# Patient Record
Sex: MALE | Race: WHITE | NOT HISPANIC OR LATINO
[De-identification: names, ages, dates, MRNs, and addresses within clinical notes are randomized per-mention and may not be internally consistent; named-entity substitution may affect disease eponyms.]

---

## 2017-01-20 ENCOUNTER — APPOINTMENT (OUTPATIENT)
Dept: ORTHOPEDIC SURGERY | Facility: CLINIC | Age: 27
End: 2017-01-20

## 2017-01-20 VITALS
WEIGHT: 176 LBS | SYSTOLIC BLOOD PRESSURE: 120 MMHG | DIASTOLIC BLOOD PRESSURE: 76 MMHG | HEIGHT: 69 IN | BODY MASS INDEX: 26.07 KG/M2

## 2017-03-06 ENCOUNTER — RX RENEWAL (OUTPATIENT)
Age: 27
End: 2017-03-06

## 2017-03-10 ENCOUNTER — APPOINTMENT (OUTPATIENT)
Dept: ORTHOPEDIC SURGERY | Facility: CLINIC | Age: 27
End: 2017-03-10

## 2017-03-10 VITALS
WEIGHT: 167 LBS | SYSTOLIC BLOOD PRESSURE: 120 MMHG | DIASTOLIC BLOOD PRESSURE: 80 MMHG | HEIGHT: 69 IN | BODY MASS INDEX: 24.73 KG/M2

## 2017-04-17 ENCOUNTER — RX RENEWAL (OUTPATIENT)
Age: 27
End: 2017-04-17

## 2017-06-10 ENCOUNTER — TRANSCRIPTION ENCOUNTER (OUTPATIENT)
Age: 27
End: 2017-06-10

## 2017-06-22 ENCOUNTER — APPOINTMENT (OUTPATIENT)
Dept: ORTHOPEDIC SURGERY | Facility: CLINIC | Age: 27
End: 2017-06-22

## 2017-06-22 VITALS — BODY MASS INDEX: 24.44 KG/M2 | WEIGHT: 165 LBS | HEIGHT: 69 IN

## 2017-10-24 ENCOUNTER — APPOINTMENT (OUTPATIENT)
Dept: ORTHOPEDIC SURGERY | Facility: CLINIC | Age: 27
End: 2017-10-24
Payer: COMMERCIAL

## 2017-10-24 VITALS — BODY MASS INDEX: 24.44 KG/M2 | HEIGHT: 69 IN | WEIGHT: 165 LBS

## 2017-10-24 PROCEDURE — 99213 OFFICE O/P EST LOW 20 MIN: CPT

## 2018-04-27 ENCOUNTER — INPATIENT (INPATIENT)
Facility: HOSPITAL | Age: 28
LOS: 3 days | Discharge: HOME | End: 2018-05-01
Attending: HOSPITALIST | Admitting: HOSPITALIST
Payer: COMMERCIAL

## 2018-04-27 VITALS
OXYGEN SATURATION: 100 % | TEMPERATURE: 99 F | RESPIRATION RATE: 18 BRPM | SYSTOLIC BLOOD PRESSURE: 137 MMHG | HEART RATE: 88 BPM | DIASTOLIC BLOOD PRESSURE: 72 MMHG

## 2018-04-27 DIAGNOSIS — B00.3 HERPESVIRAL MENINGITIS: ICD-10-CM

## 2018-04-27 DIAGNOSIS — Z29.9 ENCOUNTER FOR PROPHYLACTIC MEASURES, UNSPECIFIED: ICD-10-CM

## 2018-04-27 DIAGNOSIS — A41.9 SEPSIS, UNSPECIFIED ORGANISM: ICD-10-CM

## 2018-04-27 DIAGNOSIS — R51 HEADACHE: ICD-10-CM

## 2018-04-27 LAB
ALBUMIN SERPL ELPH-MCNC: 4.6 G/DL — SIGNIFICANT CHANGE UP (ref 3.3–5)
ALP SERPL-CCNC: 56 U/L — SIGNIFICANT CHANGE UP (ref 40–120)
ALT FLD-CCNC: 25 U/L — SIGNIFICANT CHANGE UP (ref 4–41)
APPEARANCE UR: CLEAR — SIGNIFICANT CHANGE UP
AST SERPL-CCNC: 24 U/L — SIGNIFICANT CHANGE UP (ref 4–40)
BASE EXCESS BLDV CALC-SCNC: 1.9 MMOL/L — SIGNIFICANT CHANGE UP
BASOPHILS # BLD AUTO: 0.05 K/UL — SIGNIFICANT CHANGE UP (ref 0–0.2)
BASOPHILS NFR BLD AUTO: 0.5 % — SIGNIFICANT CHANGE UP (ref 0–2)
BILIRUB SERPL-MCNC: 0.6 MG/DL — SIGNIFICANT CHANGE UP (ref 0.2–1.2)
BILIRUB UR-MCNC: NEGATIVE — SIGNIFICANT CHANGE UP
BLOOD UR QL VISUAL: NEGATIVE — SIGNIFICANT CHANGE UP
BUN SERPL-MCNC: 13 MG/DL — SIGNIFICANT CHANGE UP (ref 7–23)
CALCIUM SERPL-MCNC: 9.5 MG/DL — SIGNIFICANT CHANGE UP (ref 8.4–10.5)
CHLORIDE SERPL-SCNC: 100 MMOL/L — SIGNIFICANT CHANGE UP (ref 98–107)
CLARITY CSF: SIGNIFICANT CHANGE UP
CO2 SERPL-SCNC: 26 MMOL/L — SIGNIFICANT CHANGE UP (ref 22–31)
COLOR CSF: SIGNIFICANT CHANGE UP
COLOR SPEC: SIGNIFICANT CHANGE UP
CREAT SERPL-MCNC: 1.3 MG/DL — SIGNIFICANT CHANGE UP (ref 0.5–1.3)
EOSINOPHIL # BLD AUTO: 0.05 K/UL — SIGNIFICANT CHANGE UP (ref 0–0.5)
EOSINOPHIL NFR BLD AUTO: 0.5 % — SIGNIFICANT CHANGE UP (ref 0–6)
GAS PNL BLDV: 137 MMOL/L — SIGNIFICANT CHANGE UP (ref 136–146)
GLUCOSE BLDV-MCNC: 101 — HIGH (ref 70–99)
GLUCOSE CSF-MCNC: 61 MG/DL — SIGNIFICANT CHANGE UP (ref 40–70)
GLUCOSE SERPL-MCNC: 104 MG/DL — HIGH (ref 70–99)
GLUCOSE UR-MCNC: NEGATIVE — SIGNIFICANT CHANGE UP
GRAM STN CSF: SIGNIFICANT CHANGE UP
HCO3 BLDV-SCNC: 25 MMOL/L — SIGNIFICANT CHANGE UP (ref 20–27)
HCT VFR BLD CALC: 46.5 % — SIGNIFICANT CHANGE UP (ref 39–50)
HCT VFR BLDV CALC: 48.5 % — SIGNIFICANT CHANGE UP (ref 39–51)
HGB BLD-MCNC: 15.6 G/DL — SIGNIFICANT CHANGE UP (ref 13–17)
HGB BLDV-MCNC: 15.9 G/DL — SIGNIFICANT CHANGE UP (ref 13–17)
HSV2 DNA CSF QL NAA+PROBE: DETECTED — SIGNIFICANT CHANGE UP
IMM GRANULOCYTES # BLD AUTO: 0.04 # — SIGNIFICANT CHANGE UP
IMM GRANULOCYTES NFR BLD AUTO: 0.4 % — SIGNIFICANT CHANGE UP (ref 0–1.5)
KETONES UR-MCNC: NEGATIVE — SIGNIFICANT CHANGE UP
LEUKOCYTE ESTERASE UR-ACNC: NEGATIVE — SIGNIFICANT CHANGE UP
LYMPHOCYTES # BLD AUTO: 1.48 K/UL — SIGNIFICANT CHANGE UP (ref 1–3.3)
LYMPHOCYTES # BLD AUTO: 14.8 % — SIGNIFICANT CHANGE UP (ref 13–44)
LYMPHOCYTES # CSF: 83 % — SIGNIFICANT CHANGE UP
MCHC RBC-ENTMCNC: 29.9 PG — SIGNIFICANT CHANGE UP (ref 27–34)
MCHC RBC-ENTMCNC: 33.5 % — SIGNIFICANT CHANGE UP (ref 32–36)
MCV RBC AUTO: 89.1 FL — SIGNIFICANT CHANGE UP (ref 80–100)
MONOCYTES # BLD AUTO: 0.75 K/UL — SIGNIFICANT CHANGE UP (ref 0–0.9)
MONOCYTES # CSF: 12 % — SIGNIFICANT CHANGE UP
MONOCYTES NFR BLD AUTO: 7.5 % — SIGNIFICANT CHANGE UP (ref 2–14)
NEUTROPHILS # BLD AUTO: 7.66 K/UL — HIGH (ref 1.8–7.4)
NEUTROPHILS NFR BLD AUTO: 76.3 % — SIGNIFICANT CHANGE UP (ref 43–77)
NEUTS SEG NFR CSF MANUAL: 5 % — SIGNIFICANT CHANGE UP
NITRITE UR-MCNC: NEGATIVE — SIGNIFICANT CHANGE UP
NRBC # FLD: 0 — SIGNIFICANT CHANGE UP
NRBC NFR CSF: 319 CELL/UL — CRITICAL HIGH (ref 0–5)
PCO2 BLDV: 45 MMHG — SIGNIFICANT CHANGE UP (ref 41–51)
PH BLDV: 7.38 PH — SIGNIFICANT CHANGE UP (ref 7.32–7.43)
PH UR: 6.5 — SIGNIFICANT CHANGE UP (ref 4.6–8)
PLATELET # BLD AUTO: 237 K/UL — SIGNIFICANT CHANGE UP (ref 150–400)
PMV BLD: 9.1 FL — SIGNIFICANT CHANGE UP (ref 7–13)
PO2 BLDV: 33 MMHG — LOW (ref 35–40)
POTASSIUM BLDV-SCNC: 4.1 MMOL/L — SIGNIFICANT CHANGE UP (ref 3.4–4.5)
POTASSIUM SERPL-MCNC: 4 MMOL/L — SIGNIFICANT CHANGE UP (ref 3.5–5.3)
POTASSIUM SERPL-SCNC: 4 MMOL/L — SIGNIFICANT CHANGE UP (ref 3.5–5.3)
PROT CSF-MCNC: 132 MG/DL — HIGH (ref 15–40)
PROT SERPL-MCNC: 7.4 G/DL — SIGNIFICANT CHANGE UP (ref 6–8.3)
PROT UR-MCNC: 20 MG/DL — SIGNIFICANT CHANGE UP
RBC # BLD: 5.22 M/UL — SIGNIFICANT CHANGE UP (ref 4.2–5.8)
RBC # CSF: 3500 CELL/UL — HIGH (ref 0–0)
RBC # FLD: 12.4 % — SIGNIFICANT CHANGE UP (ref 10.3–14.5)
RBC CASTS # UR COMP ASSIST: SIGNIFICANT CHANGE UP (ref 0–?)
SAO2 % BLDV: 60.7 % — SIGNIFICANT CHANGE UP (ref 60–85)
SODIUM SERPL-SCNC: 139 MMOL/L — SIGNIFICANT CHANGE UP (ref 135–145)
SP GR SPEC: 1.02 — SIGNIFICANT CHANGE UP (ref 1–1.04)
SPECIMEN SOURCE: SIGNIFICANT CHANGE UP
TOTAL CELLS COUNTED, SPINAL FLUID: 100 CELLS — SIGNIFICANT CHANGE UP
UROBILINOGEN FLD QL: NORMAL MG/DL — SIGNIFICANT CHANGE UP
WBC # BLD: 10.03 K/UL — SIGNIFICANT CHANGE UP (ref 3.8–10.5)
WBC # FLD AUTO: 10.03 K/UL — SIGNIFICANT CHANGE UP (ref 3.8–10.5)
WBC UR QL: SIGNIFICANT CHANGE UP (ref 0–?)
XANTHOCHROMIA: SIGNIFICANT CHANGE UP

## 2018-04-27 PROCEDURE — 71046 X-RAY EXAM CHEST 2 VIEWS: CPT | Mod: 26

## 2018-04-27 PROCEDURE — 93010 ELECTROCARDIOGRAM REPORT: CPT

## 2018-04-27 PROCEDURE — 70496 CT ANGIOGRAPHY HEAD: CPT | Mod: 26

## 2018-04-27 PROCEDURE — 99223 1ST HOSP IP/OBS HIGH 75: CPT | Mod: GC

## 2018-04-27 PROCEDURE — 70450 CT HEAD/BRAIN W/O DYE: CPT | Mod: 26,59

## 2018-04-27 RX ORDER — SODIUM CHLORIDE 9 MG/ML
1000 INJECTION INTRAMUSCULAR; INTRAVENOUS; SUBCUTANEOUS
Qty: 0 | Refills: 0 | Status: DISCONTINUED | OUTPATIENT
Start: 2018-04-27 | End: 2018-04-28

## 2018-04-27 RX ORDER — ONDANSETRON 8 MG/1
4 TABLET, FILM COATED ORAL ONCE
Qty: 0 | Refills: 0 | Status: COMPLETED | OUTPATIENT
Start: 2018-04-27 | End: 2018-04-27

## 2018-04-27 RX ORDER — ACETAMINOPHEN 500 MG
650 TABLET ORAL EVERY 6 HOURS
Qty: 0 | Refills: 0 | Status: DISCONTINUED | OUTPATIENT
Start: 2018-04-27 | End: 2018-05-01

## 2018-04-27 RX ORDER — ACYCLOVIR SODIUM 500 MG
750 VIAL (EA) INTRAVENOUS ONCE
Qty: 0 | Refills: 0 | Status: COMPLETED | OUTPATIENT
Start: 2018-04-27 | End: 2018-04-27

## 2018-04-27 RX ORDER — SODIUM CHLORIDE 9 MG/ML
1000 INJECTION INTRAMUSCULAR; INTRAVENOUS; SUBCUTANEOUS ONCE
Qty: 0 | Refills: 0 | Status: COMPLETED | OUTPATIENT
Start: 2018-04-27 | End: 2018-04-27

## 2018-04-27 RX ORDER — KETOROLAC TROMETHAMINE 30 MG/ML
15 SYRINGE (ML) INJECTION ONCE
Qty: 0 | Refills: 0 | Status: DISCONTINUED | OUTPATIENT
Start: 2018-04-27 | End: 2018-04-27

## 2018-04-27 RX ORDER — METOCLOPRAMIDE HCL 10 MG
10 TABLET ORAL ONCE
Qty: 0 | Refills: 0 | Status: COMPLETED | OUTPATIENT
Start: 2018-04-27 | End: 2018-04-27

## 2018-04-27 RX ORDER — ACETAMINOPHEN 500 MG
650 TABLET ORAL ONCE
Qty: 0 | Refills: 0 | Status: COMPLETED | OUTPATIENT
Start: 2018-04-27 | End: 2018-04-27

## 2018-04-27 RX ORDER — MORPHINE SULFATE 50 MG/1
2 CAPSULE, EXTENDED RELEASE ORAL EVERY 4 HOURS
Qty: 0 | Refills: 0 | Status: DISCONTINUED | OUTPATIENT
Start: 2018-04-27 | End: 2018-04-30

## 2018-04-27 RX ORDER — MORPHINE SULFATE 50 MG/1
4 CAPSULE, EXTENDED RELEASE ORAL ONCE
Qty: 0 | Refills: 0 | Status: DISCONTINUED | OUTPATIENT
Start: 2018-04-27 | End: 2018-04-27

## 2018-04-27 RX ORDER — METOCLOPRAMIDE HCL 10 MG
10 TABLET ORAL EVERY 8 HOURS
Qty: 0 | Refills: 0 | Status: DISCONTINUED | OUTPATIENT
Start: 2018-04-27 | End: 2018-05-01

## 2018-04-27 RX ORDER — CEFTRIAXONE 500 MG/1
2 INJECTION, POWDER, FOR SOLUTION INTRAMUSCULAR; INTRAVENOUS ONCE
Qty: 0 | Refills: 0 | Status: COMPLETED | OUTPATIENT
Start: 2018-04-27 | End: 2018-04-27

## 2018-04-27 RX ORDER — KETOROLAC TROMETHAMINE 30 MG/ML
30 SYRINGE (ML) INJECTION ONCE
Qty: 0 | Refills: 0 | Status: DISCONTINUED | OUTPATIENT
Start: 2018-04-27 | End: 2018-04-27

## 2018-04-27 RX ORDER — ONDANSETRON 8 MG/1
4 TABLET, FILM COATED ORAL ONCE
Qty: 0 | Refills: 0 | Status: DISCONTINUED | OUTPATIENT
Start: 2018-04-27 | End: 2018-04-27

## 2018-04-27 RX ORDER — ACYCLOVIR SODIUM 500 MG
800 VIAL (EA) INTRAVENOUS EVERY 8 HOURS
Qty: 0 | Refills: 0 | Status: DISCONTINUED | OUTPATIENT
Start: 2018-04-28 | End: 2018-05-01

## 2018-04-27 RX ADMIN — MORPHINE SULFATE 4 MILLIGRAM(S): 50 CAPSULE, EXTENDED RELEASE ORAL at 17:24

## 2018-04-27 RX ADMIN — Medication 30 MILLIGRAM(S): at 17:43

## 2018-04-27 RX ADMIN — Medication 30 MILLIGRAM(S): at 10:58

## 2018-04-27 RX ADMIN — Medication 650 MILLIGRAM(S): at 17:24

## 2018-04-27 RX ADMIN — SODIUM CHLORIDE 1000 MILLILITER(S): 9 INJECTION INTRAMUSCULAR; INTRAVENOUS; SUBCUTANEOUS at 15:15

## 2018-04-27 RX ADMIN — SODIUM CHLORIDE 1000 MILLILITER(S): 9 INJECTION INTRAMUSCULAR; INTRAVENOUS; SUBCUTANEOUS at 17:43

## 2018-04-27 RX ADMIN — Medication 15 MILLIGRAM(S): at 19:44

## 2018-04-27 RX ADMIN — MORPHINE SULFATE 4 MILLIGRAM(S): 50 CAPSULE, EXTENDED RELEASE ORAL at 21:35

## 2018-04-27 RX ADMIN — Medication 650 MILLIGRAM(S): at 16:37

## 2018-04-27 RX ADMIN — Medication 40 MILLIGRAM(S): at 15:15

## 2018-04-27 RX ADMIN — MORPHINE SULFATE 4 MILLIGRAM(S): 50 CAPSULE, EXTENDED RELEASE ORAL at 21:19

## 2018-04-27 RX ADMIN — MORPHINE SULFATE 4 MILLIGRAM(S): 50 CAPSULE, EXTENDED RELEASE ORAL at 19:44

## 2018-04-27 RX ADMIN — SODIUM CHLORIDE 150 MILLILITER(S): 9 INJECTION INTRAMUSCULAR; INTRAVENOUS; SUBCUTANEOUS at 23:35

## 2018-04-27 RX ADMIN — Medication 265 MILLIGRAM(S): at 21:10

## 2018-04-27 RX ADMIN — Medication 10 MILLIGRAM(S): at 08:29

## 2018-04-27 RX ADMIN — CEFTRIAXONE 100 GRAM(S): 500 INJECTION, POWDER, FOR SOLUTION INTRAMUSCULAR; INTRAVENOUS at 17:43

## 2018-04-27 RX ADMIN — Medication 10 MILLIGRAM(S): at 13:54

## 2018-04-27 RX ADMIN — ONDANSETRON 4 MILLIGRAM(S): 8 TABLET, FILM COATED ORAL at 16:37

## 2018-04-27 RX ADMIN — Medication 15 MILLIGRAM(S): at 17:43

## 2018-04-27 RX ADMIN — SODIUM CHLORIDE 1000 MILLILITER(S): 9 INJECTION INTRAMUSCULAR; INTRAVENOUS; SUBCUTANEOUS at 08:29

## 2018-04-27 NOTE — H&P ADULT - HISTORY OF PRESENT ILLNESS
28yM with pmhx of wrestlers herpes? p/w b/l frontal pulsating HA since 4/26. Patient states that the HA woke him from sleep on Thursday morning at 3AM.  Associated with  nausea, fevers to 100.5 at home, lower back pain, photophobia, phonophobia. He assumed it was a migraine and took advil with no relief of his sx's.  Patient denies any rash, sick contacts, trauma, CP, SOB, chest palpitations, abdominal pain, dysuria, hematuria, joint pain, diarrhea, vomiting. Denies any prior hx of HA's or migraines. 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 States he hasn't had an outbreak in over a year. Denies any genital lesions, mouth lesions.  VS in the ED:  Febrile to 102.6, HR: , BP: 126//80, RR: 18 100% on RA. In the ED he received valium 2mg , ketorolac 45mg, solumedrol 40, reglan 10mg x 2, morphine 8mg, zofran, 3L NS bolus. Ceftriaxone 2g and Acyclovir 750mg

## 2018-04-27 NOTE — H&P ADULT - ASSESSMENT
28yM with pmhx of wrestlers herpes? 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

## 2018-04-27 NOTE — ED PROVIDER NOTE - CARE PLAN
Principal Discharge DX:	Headache  Assessment and plan of treatment:	Thank you for visiting our Emergency Department, it has been a pleasure taking part in your healthcare.    Your discharge diagnosis is: headache   Please take all discharge medications as indicated below:  Take Motrin/Tylenol for pain as needed, please follow instructions on manufacturers label. If you have any questions please consult a pharmacist or your PMD.  Please follow up with your PMD within x48 hours.  Please follow up with your neurologist within x48 hours.  A copy of resulted labs, imaging, and findings have been provided to you.   You have had a detailed discussion with your provider regarding your diagnosis, care management and discharge planning including, but not limited to: return precautions, follow up visits with existing or new providers, new prescriptions and/or medication changes, wound and/or spint/cast care or other care   aspects specific to your diagnosis and treatment. You have been given the opportunity to have your questions answered. At this time you have been deemed stable and fit for discharge.  Return precautions to the Emergency Department include but are not limited to: unrelenting nausea, vomiting, fever, chills, chest pain, shortness of breath, dizziness, chest or abdominal pain, worsening back pain, syncope, blood in urine or stool, headache that doesn't resolve, numbness or tingling, loss of sensation, loss of motor function, or any other concerning symptoms.

## 2018-04-27 NOTE — H&P ADULT - NSHPLABSRESULTS_GEN_ALL_CORE
LABS: Personally Read and Interpreted: CSF with elevated protein to 132, 300+ nucleated cells with 83-90% lymphocyte                        15.6   10.03 )-----------( 237      ( 2018 08:32 )             46.5     Hgb Trend: 15.6<--          139  |  100  |  13  ----------------------------<  104<H>  4.0   |  26  |  1.30    Ca    9.5      2018 08:32    TPro  7.4  /  Alb  4.6  /  TBili  0.6  /  DBili  x   /  AST  24  /  ALT  25  /  AlkPhos  56      Creatinine Trend: 1.30<--    Urinalysis Basic - ( 2018 09:49 )    Color: PLYEL / Appearance: CLEAR / S.016 / pH: 6.5  Gluc: NEGATIVE / Ketone: NEGATIVE  / Bili: NEGATIVE / Urobili: NORMAL mg/dL   Blood: NEGATIVE / Protein: 20 mg/dL / Nitrite: NEGATIVE   Leuk Esterase: NEGATIVE / RBC: 0-2 / WBC 0-2   Sq Epi: x / Non Sq Epi: x / Bacteria: x      EKG: none available     IMAGING:    < from: CT Angio Head w/ IV Cont (18 @ 13:48) >    HEAD CTA:  No evidence for occlusion or aneurysm.    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    POSTCONTRAST HEAD CT:  No acute intracranial pathology or abnormal enhancement.    < end of copied text > LABS: Personally Read and Interpreted: CSF with elevated protein to 132, 300+ nucleated cells with 83-90% lymphocyte                        15.6   10.03 )-----------( 237      ( 2018 08:32 )             46.5     Hgb Trend: 15.6<--          139  |  100  |  13  ----------------------------<  104<H>  4.0   |  26  |  1.30    Ca    9.5      2018 08:32    TPro  7.4  /  Alb  4.6  /  TBili  0.6  /  DBili  x   /  AST  24  /  ALT  25  /  AlkPhos  56      Creatinine Trend: 1.30<--    Urinalysis Basic - ( 2018 09:49 )    Color: PLYEL / Appearance: CLEAR / S.016 / pH: 6.5  Gluc: NEGATIVE / Ketone: NEGATIVE  / Bili: NEGATIVE / Urobili: NORMAL mg/dL   Blood: NEGATIVE / Protein: 20 mg/dL / Nitrite: NEGATIVE   Leuk Esterase: NEGATIVE / RBC: 0-2 / WBC 0-2   Sq Epi: x / Non Sq Epi: x / Bacteria: x      EKG: none available     IMAGING:    CXR- personally reviewed: no consolidations or effusions noted.    < from: CT Angio Head w/ IV Cont (18 @ 13:48) >    HEAD CTA:  No evidence for occlusion or aneurysm.    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    POSTCONTRAST HEAD CT:  No acute intracranial pathology or abnormal enhancement.    < end of copied text >

## 2018-04-27 NOTE — ED PROVIDER NOTE - OBJECTIVE STATEMENT
28M no pmh presents with a cc of b/l frontal pulsating HA awaking pt from sleep x2 days ago, a/w nausea, fevers to 100.5 at home, lower back pain, photophobia, phonophobia. No rash, no sick contacts. Vaccinations UTD. Never had headache like this before, no prior hx of migraines. No head trauma, no falls, no AC. Denies v/cp/sob. Denies syncope. Denies chest palpitations, abdominal pain. Denies dysuria, hematuria, hematochezia, BRBPR, tarry stools, diarrhea, constipation. 28M hx of wrestlers herpes presents with a cc of b/l frontal pulsating HA awaking pt from sleep x2 days ago, a/w nausea, fevers to 100.5 at home, lower back pain, photophobia, phonophobia. No rash, no sick contacts. Vaccinations UTD. Never had headache like this before, no prior hx of migraines. No head trauma, no falls, no AC. Denies v/cp/sob. Denies syncope. Denies chest palpitations, abdominal pain. Denies dysuria, hematuria, hematochezia, BRBPR, tarry stools, diarrhea, constipation.

## 2018-04-27 NOTE — H&P ADULT - NSHPREVIEWOFSYSTEMS_GEN_ALL_CORE
Constitutional: +fevers, chills, Denies night sweats, weight loss  HEENT: + visual changes. Denies hearing changes, rhinitis, odynophagia, or dysphagia  Cardiovascular: denies palpitations, chest pain, edema  Respiratory: denies SOB, wheezing  Gastrointestinal: + Nausea, Denies Diarrhea/ constipation, abdominal pain, hematochezia, melena  : denies dysuria, hematuria  MSK: denies weakness, joint pain. + back pain  Neuro: Denies Paresthesia/ weakness  Heme: Denies bleeding or hemoptysis   Skin: denies new rashes or masses

## 2018-04-27 NOTE — ED PROVIDER NOTE - PLAN OF CARE
Thank you for visiting our Emergency Department, it has been a pleasure taking part in your healthcare.    Your discharge diagnosis is: headache   Please take all discharge medications as indicated below:  Take Motrin/Tylenol for pain as needed, please follow instructions on manufacturers label. If you have any questions please consult a pharmacist or your PMD.  Please follow up with your PMD within x48 hours.  Please follow up with your neurologist within x48 hours.  A copy of resulted labs, imaging, and findings have been provided to you.   You have had a detailed discussion with your provider regarding your diagnosis, care management and discharge planning including, but not limited to: return precautions, follow up visits with existing or new providers, new prescriptions and/or medication changes, wound and/or spint/cast care or other care   aspects specific to your diagnosis and treatment. You have been given the opportunity to have your questions answered. At this time you have been deemed stable and fit for discharge.  Return precautions to the Emergency Department include but are not limited to: unrelenting nausea, vomiting, fever, chills, chest pain, shortness of breath, dizziness, chest or abdominal pain, worsening back pain, syncope, blood in urine or stool, headache that doesn't resolve, numbness or tingling, loss of sensation, loss of motor function, or any other concerning symptoms.

## 2018-04-27 NOTE — ED ADULT NURSE REASSESSMENT NOTE - NS ED NURSE REASSESS COMMENT FT1
Pt. alert, awake, c/o persistent headache and mid-back pain s/p prior LP earlier. Admits to chills, and body aches. Denies dizziness, LOC, N/V, SOB, cough, chest pain. Respirations even, unlabored. Appears uncomfortable and restless, skin warm to touch, VS as noted. MD notified and aware. Pt. medicated as ordered. Remains on droplet precautions for r/o meningitis.

## 2018-04-27 NOTE — ED PROVIDER NOTE - HEAD, MLM
Is This A New Presentation, Or A Follow-Up?: Skin Lesions How Severe Is Your Skin Lesion?: mild Have Your Skin Lesions Been Treated?: not been treated Additional History: Has few lesions on scalp and left preauricular area reported.  No hx of skin cancer. Which Family Member (Optional)?: Brother Head is atraumatic. Head shape is symmetrical.

## 2018-04-27 NOTE — H&P ADULT - NSHPPHYSICALEXAM_GEN_ALL_CORE
PHYSICAL EXAM:  Vital Signs Last 24 Hrs  T(C): 37.3 (04-27-18 @ 22:27)  T(F): 99.1 (04-27-18 @ 22:27), Max: 102.6 (04-27-18 @ 16:41)  HR: 89 (04-27-18 @ 22:27) (73 - 107)  BP: 140/80 (04-27-18 @ 22:27)  BP(mean): --  RR: 19 (04-27-18 @ 22:27) (16 - 19)  SpO2: 100% (04-27-18 @ 22:27) (98% - 100%)  Wt(kg): --    Constitutional: NAD, awake and alert, diaphoretic   EYES: EOMI, PERRL, Photophobia  ENT:  Normal Hearing, no tonsillar exudates   Neck: Soft and supple, No JVD, nuchal rigidity noted, + Brudzinski sign   Respiratory: Breath sounds are clear bilaterally, No wheezing, rales or rhonchi  Cardiovascular: S1 and S2, regular rate and rhythm, no Murmurs, gallops or rubs  Gastrointestinal: Bowel Sounds present, soft, nontender, nondistended, no guarding, no rebound  Extremities: No cyanosis or clubbing; warm to touch  Vascular: 2+ peripheral pulses lower ex  Neurological: A/O x 3, no focal deficits, + Brudzinski sign, - Kernig sign  Musculoskeletal: 5/5 strength b/l upper and lower extremities  Skin: Erythematous maculopapular rash noted on trunk, warm & dry  Psych: no depression or anhedonia  Heme: no bruises, no nose bleeds

## 2018-04-27 NOTE — H&P ADULT - PROBLEM SELECTOR PLAN 4
IMPROVE VTE Individual Risk Assessment    RISK                                                          Points  [] Previous VTE                                           3  [] Thrombophilia                                        2  [] Lower limb paralysis                              2   [] Current Cancer                                       2   [] Immobilization > 24 hrs                        1  [] ICU/CCU stay > 24 hours                       1  [] Age > 60                                                   1    IMPROVE VTE Score: 0    No chemical DVT PPX indicated

## 2018-04-27 NOTE — ED ADULT NURSE REASSESSMENT NOTE - GENERAL PATIENT STATE
comfortable appearance
comfortable appearance
comfortable appearance/smiling/interactive/improvement verbalized/family/SO at bedside

## 2018-04-27 NOTE — ED ADULT TRIAGE NOTE - CHIEF COMPLAINT QUOTE
pt. c/o frontal "pulsating headache" x 2 days, reports nausea w/out vomiting and dizziness when moving his head.  Took Excedrin migraine and Tylenol w/ little relief.  Denies neck stiffness.  No PMHx.

## 2018-04-27 NOTE — ED PROVIDER NOTE - PROGRESS NOTE DETAILS
Dr. Ortega: spoke at length with pt PMD; pt sibling had severe course in past from sepsis after an ER visit and family rightly so very concerned; pt symptoms are primarily lower back pain and headache.  Had multiple conversations with mom and dad with patient about course of bacterial meningitis and this being 2 days out with normal labs and being afebrile with no neck pain likelihood very very low.  Also informed of symptoms to look out for.  Spoke with radiologist no obvious signs of meningial disease but was informed CT poor test.  At this point family would prefer not to get LP and I am in agreement Dr. Ortega: after speaking again to family; pt lifted the most weight he ever has the night of headache; more concern for SAH; spoke with family; will proceed with LP Dr. Ortega: LP unsuccessful; as discussed below testing threshold for meningitis but some concern for SAH; will get CTA to r/o aneurysm Dr. Ortega: CTA negative for SAH/aneurysm; pt having worsening headache; will treat with IVF and steroids; will sign out to incoming attending if headache continues neurology consult and likely admission; if improves given CTA negative and afebrile and well appearing throughout course here can be discharged with neurology follow up Joselo PGY1: pt reassessed at bedside reports HA not improved however asking to be discharged, CTA negative, continues to be afebrile, pt instructed on strict return precautions, pt endorsed understanding will return to ED if develops fever or if other sx worsen. Will f/u w/ neurology outpatient. Dr. Ortega: pt on discharge vitals now febrile and tachycardic; will treat for meningitis; colleague Dr. Sheridan will retry attempt at LP so cultures can be obtained and cell count; pt agreeable; Dr. Zhang has received signout and will make dispo pending CSF and reassessment

## 2018-04-27 NOTE — ED PROVIDER NOTE - ATTENDING CONTRIBUTION TO CARE
agree with resident  28 yr old male with no sig PMH presents with acute onset of headache 2 nights ago that awoke him from sleep.  Complaint of lower back pain since and yesterday afternoon noted 100.5 temp.  States prior to headache had lifted more (230ish) than he ever has.  Never has had headache like this before.      PE: well appearing, afebrile, CTAB/L; s1 s2 no m/r/g abd soft/NT/ND ext: no edema Neuro: CNs intact 5/5 motor UE and LE; sensation intact; gait stable

## 2018-04-27 NOTE — H&P ADULT - NSHPSOCIALHISTORY_GEN_ALL_CORE
Patient is a current 1 pack per month smoker for 10 years. Occasional ETOH use, <2 drinks a week, endorses occasional Cocaine use. Sexually active, multiple partners, uses condoms consistently. Prior hx of chlamydia infection 3 years ago

## 2018-04-27 NOTE — ED PROVIDER NOTE - MEDICAL DECISION MAKING DETAILS
Joselo PGY1: 28M no pmh presents with a cc of b/l frontal pulsating HA awaking pt from sleep x2 days ago, a/w nausea, fevers to 100.5 at home, lower back pain, photophobia, phonophobia. Pt is well appearing, vss no fever in ED, non toxic, no e/o FND on exam, able to fully laterally range neck, will get basic labs, ivf, pain control, consider cth if sx do no improve, low concern for ich/sah given no mechanism consider possible viral meningitis, will reassess

## 2018-04-27 NOTE — H&P ADULT - PROBLEM SELECTOR PLAN 2
- Patient meeting sepsis criteria with Fever, tachycardia.  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

## 2018-04-27 NOTE — H&P ADULT - PROBLEM SELECTOR PLAN 1
- Patient with aseptic meningitis on CSF LP, Protein elevated, lymphocyte predominant lineage, HSV-2 PCR +  - C/w acyclovir 10mg/kg q 8hrs for 10-14 days, c/w  cc/hr for 12hrs and monitor for kidney dysfunction. Especially in setting of 45mg Ketorolac  - Pain control with morphine and tylenol  - Reglan for nausea. Will check EKG  - F/u BCX and CSF gram stain - Patient has aseptic meningitis on CSF LP, Protein elevated, lymphocyte predominant lineage, HSV-2 PCR +  - C/w acyclovir 10mg/kg q 8hrs for 10-14 days, c/w  cc/hr for 12hrs and monitor for kidney dysfunction. Especially in setting of 45mg Ketorolac  - Pain control with morphine and tylenol  - Reglan for nausea. Will check EKG  - F/u BCX and CSF gram stain

## 2018-04-27 NOTE — ED ADULT NURSE REASSESSMENT NOTE - SYMPTOMS
states headache is much better, continues to c.o. back pain 5/10 worse with bending. Dr Zhang made aware, medicated as ordered.
none

## 2018-04-28 LAB
AMPHET UR-MCNC: NEGATIVE — SIGNIFICANT CHANGE UP
BACTERIA UR CULT: SIGNIFICANT CHANGE UP
BARBITURATES UR SCN-MCNC: NEGATIVE — SIGNIFICANT CHANGE UP
BENZODIAZ UR-MCNC: NEGATIVE — SIGNIFICANT CHANGE UP
BUN SERPL-MCNC: 6 MG/DL — LOW (ref 7–23)
CALCIUM SERPL-MCNC: 8.8 MG/DL — SIGNIFICANT CHANGE UP (ref 8.4–10.5)
CANNABINOIDS UR-MCNC: NEGATIVE — SIGNIFICANT CHANGE UP
CHLORIDE SERPL-SCNC: 103 MMOL/L — SIGNIFICANT CHANGE UP (ref 98–107)
CO2 SERPL-SCNC: 24 MMOL/L — SIGNIFICANT CHANGE UP (ref 22–31)
COCAINE METAB.OTHER UR-MCNC: NEGATIVE — SIGNIFICANT CHANGE UP
CREAT SERPL-MCNC: 1.14 MG/DL — SIGNIFICANT CHANGE UP (ref 0.5–1.3)
GLUCOSE SERPL-MCNC: 91 MG/DL — SIGNIFICANT CHANGE UP (ref 70–99)
HCT VFR BLD CALC: 40.3 % — SIGNIFICANT CHANGE UP (ref 39–50)
HGB BLD-MCNC: 13.7 G/DL — SIGNIFICANT CHANGE UP (ref 13–17)
MAGNESIUM SERPL-MCNC: 1.7 MG/DL — SIGNIFICANT CHANGE UP (ref 1.6–2.6)
MCHC RBC-ENTMCNC: 30 PG — SIGNIFICANT CHANGE UP (ref 27–34)
MCHC RBC-ENTMCNC: 34 % — SIGNIFICANT CHANGE UP (ref 32–36)
MCV RBC AUTO: 88.2 FL — SIGNIFICANT CHANGE UP (ref 80–100)
METHADONE UR-MCNC: NEGATIVE — SIGNIFICANT CHANGE UP
NRBC # FLD: 0 — SIGNIFICANT CHANGE UP
OPIATES UR-MCNC: POSITIVE — SIGNIFICANT CHANGE UP
OXYCODONE UR-MCNC: NEGATIVE — SIGNIFICANT CHANGE UP
PCP UR-MCNC: NEGATIVE — SIGNIFICANT CHANGE UP
PHOSPHATE SERPL-MCNC: 3 MG/DL — SIGNIFICANT CHANGE UP (ref 2.5–4.5)
PLATELET # BLD AUTO: 217 K/UL — SIGNIFICANT CHANGE UP (ref 150–400)
PMV BLD: 9 FL — SIGNIFICANT CHANGE UP (ref 7–13)
POTASSIUM SERPL-MCNC: 3.8 MMOL/L — SIGNIFICANT CHANGE UP (ref 3.5–5.3)
POTASSIUM SERPL-SCNC: 3.8 MMOL/L — SIGNIFICANT CHANGE UP (ref 3.5–5.3)
RBC # BLD: 4.57 M/UL — SIGNIFICANT CHANGE UP (ref 4.2–5.8)
RBC # FLD: 12.3 % — SIGNIFICANT CHANGE UP (ref 10.3–14.5)
SODIUM SERPL-SCNC: 139 MMOL/L — SIGNIFICANT CHANGE UP (ref 135–145)
SPECIMEN SOURCE: SIGNIFICANT CHANGE UP
WBC # BLD: 11.81 K/UL — HIGH (ref 3.8–10.5)
WBC # FLD AUTO: 11.81 K/UL — HIGH (ref 3.8–10.5)

## 2018-04-28 PROCEDURE — 99254 IP/OBS CNSLTJ NEW/EST MOD 60: CPT | Mod: GC

## 2018-04-28 PROCEDURE — 99233 SBSQ HOSP IP/OBS HIGH 50: CPT | Mod: GC

## 2018-04-28 RX ORDER — CYCLOBENZAPRINE HYDROCHLORIDE 10 MG/1
5 TABLET, FILM COATED ORAL THREE TIMES A DAY
Qty: 0 | Refills: 0 | Status: DISCONTINUED | OUTPATIENT
Start: 2018-04-28 | End: 2018-05-01

## 2018-04-28 RX ORDER — MORPHINE SULFATE 50 MG/1
4 CAPSULE, EXTENDED RELEASE ORAL ONCE
Qty: 0 | Refills: 0 | Status: DISCONTINUED | OUTPATIENT
Start: 2018-04-28 | End: 2018-04-28

## 2018-04-28 RX ORDER — LANOLIN ALCOHOL/MO/W.PET/CERES
3 CREAM (GRAM) TOPICAL AT BEDTIME
Qty: 0 | Refills: 0 | Status: DISCONTINUED | OUTPATIENT
Start: 2018-04-28 | End: 2018-05-01

## 2018-04-28 RX ORDER — SODIUM CHLORIDE 9 MG/ML
1000 INJECTION INTRAMUSCULAR; INTRAVENOUS; SUBCUTANEOUS
Qty: 0 | Refills: 0 | Status: DISCONTINUED | OUTPATIENT
Start: 2018-04-28 | End: 2018-05-01

## 2018-04-28 RX ORDER — LIDOCAINE 4 G/100G
1 CREAM TOPICAL DAILY
Qty: 0 | Refills: 0 | Status: DISCONTINUED | OUTPATIENT
Start: 2018-04-28 | End: 2018-05-01

## 2018-04-28 RX ADMIN — MORPHINE SULFATE 2 MILLIGRAM(S): 50 CAPSULE, EXTENDED RELEASE ORAL at 14:35

## 2018-04-28 RX ADMIN — MORPHINE SULFATE 2 MILLIGRAM(S): 50 CAPSULE, EXTENDED RELEASE ORAL at 10:32

## 2018-04-28 RX ADMIN — MORPHINE SULFATE 2 MILLIGRAM(S): 50 CAPSULE, EXTENDED RELEASE ORAL at 04:24

## 2018-04-28 RX ADMIN — CYCLOBENZAPRINE HYDROCHLORIDE 5 MILLIGRAM(S): 10 TABLET, FILM COATED ORAL at 13:04

## 2018-04-28 RX ADMIN — MORPHINE SULFATE 2 MILLIGRAM(S): 50 CAPSULE, EXTENDED RELEASE ORAL at 19:12

## 2018-04-28 RX ADMIN — Medication 266 MILLIGRAM(S): at 05:59

## 2018-04-28 RX ADMIN — Medication 266 MILLIGRAM(S): at 13:04

## 2018-04-28 RX ADMIN — MORPHINE SULFATE 2 MILLIGRAM(S): 50 CAPSULE, EXTENDED RELEASE ORAL at 15:47

## 2018-04-28 RX ADMIN — Medication 3 MILLIGRAM(S): at 23:21

## 2018-04-28 RX ADMIN — MORPHINE SULFATE 2 MILLIGRAM(S): 50 CAPSULE, EXTENDED RELEASE ORAL at 20:14

## 2018-04-28 RX ADMIN — LIDOCAINE 1 PATCH: 4 CREAM TOPICAL at 13:04

## 2018-04-28 RX ADMIN — MORPHINE SULFATE 4 MILLIGRAM(S): 50 CAPSULE, EXTENDED RELEASE ORAL at 05:59

## 2018-04-28 RX ADMIN — Medication 3 MILLIGRAM(S): at 00:40

## 2018-04-28 RX ADMIN — MORPHINE SULFATE 2 MILLIGRAM(S): 50 CAPSULE, EXTENDED RELEASE ORAL at 00:55

## 2018-04-28 RX ADMIN — MORPHINE SULFATE 2 MILLIGRAM(S): 50 CAPSULE, EXTENDED RELEASE ORAL at 00:40

## 2018-04-28 RX ADMIN — MORPHINE SULFATE 2 MILLIGRAM(S): 50 CAPSULE, EXTENDED RELEASE ORAL at 11:52

## 2018-04-28 RX ADMIN — MORPHINE SULFATE 2 MILLIGRAM(S): 50 CAPSULE, EXTENDED RELEASE ORAL at 04:49

## 2018-04-28 RX ADMIN — Medication 266 MILLIGRAM(S): at 23:21

## 2018-04-28 RX ADMIN — MORPHINE SULFATE 4 MILLIGRAM(S): 50 CAPSULE, EXTENDED RELEASE ORAL at 06:15

## 2018-04-28 NOTE — PROGRESS NOTE ADULT - PROBLEM SELECTOR PLAN 1
- Patient has aseptic meningitis on CSF LP, Protein elevated, lymphocyte predominant lineage, HSV-2 PCR +  - C/w acyclovir 10mg/kg q 8hrs for 10-14 days, c/w  cc/hr and monitor for kidney dysfunction.   - Pain control with morphine and tylenol  - Reglan for nausea.  - F/u BCX and CSF gram stain  - ID consult for HSV meningitis

## 2018-04-28 NOTE — PROGRESS NOTE ADULT - SUBJECTIVE AND OBJECTIVE BOX
Patient is a 28y old  Male who presents with a chief complaint of Meninigitis (2018 22:53)      SUBJECTIVE / OVERNIGHT EVENTS:  Patient seen and examined at bedside. No acute events overnight. Patient reports feeling better since admission. He still has back pain that persists, which has improved with pain medications. No current fevers/chills/abdominal pain/SOB.     MEDICATIONS  (STANDING):  acyclovir IVPB 800 milliGRAM(s) IV Intermittent every 8 hours  lidocaine   Patch 1 Patch Transdermal daily  melatonin 3 milliGRAM(s) Oral at bedtime  sodium chloride 0.9%. 1000 milliLiter(s) (150 mL/Hr) IV Continuous <Continuous>    MEDICATIONS  (PRN):  acetaminophen   Tablet 650 milliGRAM(s) Oral every 6 hours PRN For Temp greater than 38 C (100.4 F)  acetaminophen   Tablet. 650 milliGRAM(s) Oral every 6 hours PRN mild to moderate pain  cyclobenzaprine 5 milliGRAM(s) Oral three times a day PRN Muscle Spasm  metoclopramide Injectable 10 milliGRAM(s) IV Push every 8 hours PRN Nausea/vomiting  morphine  - Injectable 2 milliGRAM(s) IV Push every 4 hours PRN Severe Pain (7 - 10)        CAPILLARY BLOOD GLUCOSE        Vital Signs Last 24 Hrs  T(C): 37.2 (2018 13:53), Max: 39.2 (2018 16:41)  T(F): 98.9 (2018 13:53), Max: 102.6 (2018 16:41)  HR: 66 (2018 13:53) (66 - 107)  BP: 118/71 (2018 13:53) (118/71 - 157/80)  BP(mean): --  RR: 19 (2018 13:53) (16 - 19)  SpO2: 97% (2018 13:53) (97% - 100%)      PHYSICAL EXAM:  GENERAL: NAD, well-developed  HEAD:  Atraumatic, Normocephalic  EYES: EOMI,  conjunctiva and sclera clear  NECK: Supple  CHEST/LUNG: Clear to auscultation bilaterally; No wheeze  HEART: Regular rate and rhythm; No murmurs, rubs, or gallops  ABDOMEN: Soft, Nontender, Nondistended; Bowel sounds present  EXTREMITIES:  No clubbing, cyanosis, or edema  PSYCH: AAOx3  SKIN: No rashes or lesions    LABS:                        13.7   11.81 )-----------( 217      ( 2018 06:03 )             40.3     WBC Trend: 11.81<--, 10.03<--      139  |  103  |  6<L>  ----------------------------<  91  3.8   |  24  |  1.14    Ca    8.8      2018 06:03  Phos  3.0       Mg     1.7         TPro  7.4  /  Alb  4.6  /  TBili  0.6  /  DBili  x   /  AST  24  /  ALT  25  /  AlkPhos  56      Creatinine Trend: 1.14<--, 1.30<--        Urinalysis Basic - ( 2018 09:49 )    Color: PLYEL / Appearance: CLEAR / S.016 / pH: 6.5  Gluc: NEGATIVE / Ketone: NEGATIVE  / Bili: NEGATIVE / Urobili: NORMAL mg/dL   Blood: NEGATIVE / Protein: 20 mg/dL / Nitrite: NEGATIVE   Leuk Esterase: NEGATIVE / RBC: 0-2 / WBC 0-2   Sq Epi: x / Non Sq Epi: x / Bacteria: x          RADIOLOGY & ADDITIONAL TESTS:    Imaging Personally Reviewed:    Consultant(s) Notes Reviewed:      Care Discussed with Consultants/Other Providers: Patient is a 28y old  Male who presents with a chief complaint of Meninigitis (2018 22:53)      SUBJECTIVE / OVERNIGHT EVENTS:  Patient seen and examined at bedside. No acute events overnight. Patient reports feeling better since admission. He still has back pain that persists, which has improved with pain medications. No current fevers/chills/abdominal pain/SOB.     MEDICATIONS  (STANDING):  acyclovir IVPB 800 milliGRAM(s) IV Intermittent every 8 hours  lidocaine   Patch 1 Patch Transdermal daily  melatonin 3 milliGRAM(s) Oral at bedtime  sodium chloride 0.9%. 1000 milliLiter(s) (150 mL/Hr) IV Continuous <Continuous>    MEDICATIONS  (PRN):  acetaminophen   Tablet 650 milliGRAM(s) Oral every 6 hours PRN For Temp greater than 38 C (100.4 F)  acetaminophen   Tablet. 650 milliGRAM(s) Oral every 6 hours PRN mild to moderate pain  cyclobenzaprine 5 milliGRAM(s) Oral three times a day PRN Muscle Spasm  metoclopramide Injectable 10 milliGRAM(s) IV Push every 8 hours PRN Nausea/vomiting  morphine  - Injectable 2 milliGRAM(s) IV Push every 4 hours PRN Severe Pain (7 - 10)        CAPILLARY BLOOD GLUCOSE        Vital Signs Last 24 Hrs  T(C): 37.2 (2018 13:53), Max: 39.2 (2018 16:41)  T(F): 98.9 (2018 13:53), Max: 102.6 (2018 16:41)  HR: 66 (2018 13:53) (66 - 107)  BP: 118/71 (2018 13:53) (118/71 - 157/80)  BP(mean): --  RR: 19 (2018 13:53) (16 - 19)  SpO2: 97% (2018 13:53) (97% - 100%)      PHYSICAL EXAM:  GENERAL: NAD, well-developed  HEAD:  Atraumatic, Normocephalic  EYES: EOMI,  conjunctiva and sclera clear  NECK: Supple  CHEST/LUNG: Clear to auscultation bilaterally; No wheeze  HEART: Regular rate and rhythm; No murmurs, rubs, or gallops  ABDOMEN: Soft, Nontender, Nondistended; Bowel sounds present  EXTREMITIES:  No clubbing, cyanosis, or edema  PSYCH: AAOx3  SKIN: No rashes or lesions    LABS:                        13.7   11.81 )-----------( 217      ( 2018 06:03 )             40.3     WBC Trend: 11.81<--, 10.03<--      139  |  103  |  6<L>  ----------------------------<  91  3.8   |  24  |  1.14    Ca    8.8      2018 06:03  Phos  3.0       Mg     1.7         TPro  7.4  /  Alb  4.6  /  TBili  0.6  /  DBili  x   /  AST  24  /  ALT  25  /  AlkPhos  56      Creatinine Trend: 1.14<--, 1.30<--        Urinalysis Basic - ( 2018 09:49 )    Color: PLYEL / Appearance: CLEAR / S.016 / pH: 6.5  Gluc: NEGATIVE / Ketone: NEGATIVE  / Bili: NEGATIVE / Urobili: NORMAL mg/dL   Blood: NEGATIVE / Protein: 20 mg/dL / Nitrite: NEGATIVE   Leuk Esterase: NEGATIVE / RBC: 0-2 / WBC 0-2   Sq Epi: x / Non Sq Epi: x / Bacteria: x          RADIOLOGY & ADDITIONAL TESTS:    Imaging Personally Reviewed:    Consultant(s) Notes Reviewed:      Care Discussed with Consultants/Other Providers: ID (Dr. Ayers) re: c/s for HSV meningitis

## 2018-04-28 NOTE — PROGRESS NOTE ADULT - PROBLEM SELECTOR PLAN 4
DVT ppx: IMPROVE VTE Score: 0, no chemical ppx indicated    Lavelle Warner MD  Care Model A Resident  Pager: [phone], [facility] [phone]  From 7 PM - 7AM, please page #[phone]

## 2018-04-28 NOTE — CONSULT NOTE ADULT - ATTENDING COMMENTS
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

## 2018-04-28 NOTE — PROGRESS NOTE ADULT - ASSESSMENT
28yM with pmhx of wrestlers herpes? 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

## 2018-04-28 NOTE — CONSULT NOTE ADULT - ASSESSMENT
28 male with wrestlers herpes here with Herpes meningitis.  headache, lower back pain, photophobia, phonophobia.  T max 102.6 s/p solumedrol x 1 dose and Ceftriaxone 2g x 1 dose   Lumbar puncture was done showed elevated protein and many cells with rbcs PCR positive for herpes.     Herpes meningitis  continue Acyclovir  f/u blood cultures   f/u HIV 28 male with wrestlers herpes here with Herpes meningitis.  headache, lower back pain, photophobia, phonophobia.  T max 102.6 s/p solumedrol x 1 dose and Ceftriaxone 2g x 1 dose   Lumbar puncture was done showed elevated protein and many cells with rbcs PCR positive for herpes.     Herpes meningitis  concerned about back pain  consider MRI thoracic and lumbar spine   continue Acyclovir  f/u blood cultures   f/u HIV

## 2018-04-28 NOTE — CONSULT NOTE ADULT - SUBJECTIVE AND OBJECTIVE BOX
Infectious Diseases Consult note  Patient is a 28y old  Male who presents with a chief complaint of Meninigitis (2018 22:53)    TOYA RODRIGUEZ  MRN-[id number]  HPI:  28yM with pmhx of wrestlers herpes? p/w b/l frontal pulsating HA since . Patient states that the HA woke him from sleep on Thursday morning at 3AM.  Associated with  nausea, fevers to 100.5 at home, lower back pain, photophobia, phonophobia. He assumed it was a migraine and took advil with no relief of his sx's.  Patient denies any rash, sick contacts, trauma, CP, SOB, chest palpitations, abdominal pain, dysuria, hematuria, joint pain, diarrhea, vomiting. Denies any prior hx of HA's or migraines. States he was first diagnosed with wrestler herpes behind his L knee in . States he hasn't had an outbreak in over a year. Denies any genital lesions, mouth lesions.  VS in the ED:  Febrile to 102.6, HR: , BP: 126//80, RR: 18 100% on RA. In the ED he received valium 2mg , ketorolac 45mg, solumedrol 40, reglan 10mg x 2, morphine 8mg, zofran, 3L NS bolus. Ceftriaxone 2g and Acyclovir 750mg (2018 22:53)    No sick contacts. No recent travel. No recent antibiotics.     REVIEW OF SYSTEMS  All as below unless noted otherwise  General:  Denies fever and chills. 	  Ophthalmologic: Denies any visual complaints. 	  ENMT: No throat pain.  Respiratory: No cough, sputum. No shortness of breath.   Cardiovascular: No chest pain.   Gastrointestinal: No nausea or vomiting. No abdominal pain. No diarrhea.   Genitourinary: No burning urine, no frequency. No flank pain  Musculoskeletal: No joint swelling or pain.   Neurological: No confusion. 	  Skin: No rash    PAST MEDICAL & SURGICAL HISTORY:  Herpes simplex type 2 infection  No significant past surgical history      FAMILY HISTORY:  Family history of diabetes mellitus (Grandparent)      SOCIAL HISTORY:    non smoker    no alcohol    ANTIMICROBIALS:    acyclovir IVPB 800 every 8 hours    OTHER MEDS:    acetaminophen   Tablet 650 milliGRAM(s) Oral every 6 hours PRN  acetaminophen   Tablet. 650 milliGRAM(s) Oral every 6 hours PRN  melatonin 3 milliGRAM(s) Oral at bedtime  metoclopramide Injectable 10 milliGRAM(s) IV Push every 8 hours PRN  morphine  - Injectable 2 milliGRAM(s) IV Push every 4 hours PRN  sodium chloride 0.9%. 1000 milliLiter(s) IV Continuous <Continuous>    Allergies    No Known Allergies    Intolerances      Vital Signs Last 24 Hrs  T(C): 36.8 (2018 04:23), Max: 39.2 (2018 16:41)  T(F): 98.2 (2018 04:23), Max: 102.6 (2018 16:41)  HR: 66 (2018 05:58) (66 - 107)  BP: 134/77 (2018 05:58) (126/80 - 157/80)  BP(mean): --  RR: 16 (2018 04:23) (16 - 19)  SpO2: 100% (2018 04:23) (98% - 100%)  CAPILLARY BLOOD GLUCOSE        Daily Height in cm: 175.26 (2018 22:01)    Daily     PHYSICAL EXAM:  patient in no acute respiratory distress.  Constitutional: Comfortable. Awake and alert  Eyes: PERRL EOMI. No discharge.  ENMT: No sinus tenderness.  No pharyngeal erythema.   Neck: Supple. No thyromegaly   Respiratory:  air entry bilaterally, no wheezes, rhonchi, or crackles  Cardiovascular:S1 S2 wnl, No murmurs  Gastrointestinal: Soft, no tenderness , bowel sounds present,   Genitourinary: No suprapubic tenderness. no CVA tenderness   Musculoskeletal: No joint swelling. No edema.  Vascular: peripheral pulses felt  Neurological: No grossly focal deficits  Skin: No rash   Lymph Nodes: No palpable Lymphadenopathy   Psychiatric: Affect normal  Lines:                         13.7   11.81 )-----------( 217      ( 2018 06:03 )             40.3     WBC Count: 11.81 ( @ 06:03)  WBC Count: 10.03 ( @ 08:32)        139  |  103  |  6<L>  ----------------------------<  91  3.8   |  24  |  1.14    Ca    8.8      2018 06:03  Phos  3.0       Mg     1.7         TPro  7.4  /  Alb  4.6  /  TBili  0.6  /  DBili  x   /  AST  24  /  ALT  25  /  AlkPhos  56                Urinalysis Basic - ( 2018 09:49 )    Color: PLYEL / Appearance: CLEAR / S.016 / pH: 6.5  Gluc: NEGATIVE / Ketone: NEGATIVE  / Bili: NEGATIVE / Urobili: NORMAL mg/dL   Blood: NEGATIVE / Protein: 20 mg/dL / Nitrite: NEGATIVE   Leuk Esterase: NEGATIVE / RBC: 0-2 / WBC 0-2   Sq Epi: x / Non Sq Epi: x / Bacteria: x      CSF Color: COLORLESS (18 @ 18:31)  CSF Color: PINK (18 @ 18:22)    MICROBIOLOGY:    Culture - CSF with Gram Stain (collected 2018 19:32)  Source: CEREBRAL SPINAL FLUID  Preliminary Report (2018 07:36):    CULTURE IN PROGRESS, FURTHER REPORT TO FOLLOW.    Culture - Urine (collected 2018 11:37)  Source: URINE MIDSTREAM  Final Report (2018 08:27):    NO GROWTH AT 24 HOURS          v  RADIOLOGY: Infectious Diseases Consult note  Patient is a 28y old  Male who presents with a chief complaint of Meninigitis (2018 22:53)    TOYA RODRIGUEZ  MRN-[id number]  HPI:  28 male with wrestlers herpes? here with b/l frontal pulsating headache since . Associated with  nausea, fevers to 100.5 at home, lower back pain, photophobia, phonophobia. He assumed it was a migraine and took advil with no relief of his sx's.  Patient denies any rash, trauma, CP, SOB, chest palpitations, abdominal pain, dysuria, hematuria, joint pain, diarrhea, vomiting. Denies any prior hx of HA's or migraines. States he was first diagnosed with wrestler herpes behind his L knee in . States he hasn't had an outbreak in over a year. Denies any genital lesions, mouth lesions. T max 102.6 s/p solumedrol x 1 dose and Ceftriaxone 2g x 1 dose and  now on Acyclovir. Lumbar puncture was done showed elevated protein and many cells with rbcs PCR positive for herpes. No sick contacts. No recent travel. No recent antibiotics.     REVIEW OF SYSTEMS  All as below unless noted otherwise  General:  Denies fever and chills. 	  Ophthalmologic: Denies any visual complaints. 	  ENMT: No throat pain.  Respiratory: No cough, sputum. No shortness of breath.   Cardiovascular: No chest pain.   Gastrointestinal: No nausea or vomiting. No abdominal pain. No diarrhea.   Genitourinary: No burning urine, no frequency. No flank pain  Musculoskeletal: No joint swelling or pain.   Neurological: No confusion. 	  Skin: No rash    PAST MEDICAL & SURGICAL HISTORY:  Herpes simplex type 2 infection  No significant past surgical history      FAMILY HISTORY:  Family history of diabetes mellitus (Grandparent)      SOCIAL HISTORY:    current 1 pack per month smoker for 10 years.   Occasional ETOH use, <2 drinks a week,   endorses occasional Cocaine use.   Sexually active, multiple partners, uses condoms consistently.   Prior hx of chlamydia infection 3 years ago    ANTIMICROBIALS:    acyclovir IVPB 800 every 8 hours    OTHER MEDS:    acetaminophen   Tablet 650 milliGRAM(s) Oral every 6 hours PRN  acetaminophen   Tablet. 650 milliGRAM(s) Oral every 6 hours PRN  melatonin 3 milliGRAM(s) Oral at bedtime  metoclopramide Injectable 10 milliGRAM(s) IV Push every 8 hours PRN  morphine  - Injectable 2 milliGRAM(s) IV Push every 4 hours PRN  sodium chloride 0.9%. 1000 milliLiter(s) IV Continuous <Continuous>    Allergies  No Known Allergies    Vital Signs Last 24 Hrs  T(C): 36.8 (2018 04:23), Max: 39.2 (2018 16:41)  T(F): 98.2 (2018 04:23), Max: 102.6 (2018 16:41)  HR: 66 (2018 05:58) (66 - 107)  BP: 134/77 (2018 05:58) (126/80 - 157/80)  RR: 16 (2018 04:23) (16 - 19)  SpO2: 100% (2018 04:23) (98% - 100%)  Daily Height in cm: 175.26 (2018 22:01)        PHYSICAL EXAM:  patient in no acute respiratory distress.  Constitutional: Comfortable. Awake and alert  Eyes: PERRL EOMI. No discharge.  ENMT: No sinus tenderness.  No pharyngeal erythema.   Neck: Supple. No thyromegaly   Respiratory:  air entry bilaterally, no wheezes, rhonchi, or crackles  Cardiovascular:S1 S2 wnl, No murmurs  Gastrointestinal: Soft, no tenderness , bowel sounds present,   Genitourinary: No suprapubic tenderness. no CVA tenderness   Musculoskeletal: No joint swelling. No edema.  Vascular: peripheral pulses felt  Neurological: No grossly focal deficits  Skin: No rash   Lymph Nodes: No palpable Lymphadenopathy   Psychiatric: Affect normal  Lines:                         13.7   11.81 )-----------( 217      ( 2018 06:03 )             40.3     WBC Count: 11.81 ( @ 06:03)  WBC Count: 10.03 ( @ 08:32)        139  |  103  |  6<L>  ----------------------------<  91  3.8   |  24  |  1.14    Ca    8.8      2018 06:03  Phos  3.0       Mg     1.7         TPro  7.4  /  Alb  4.6  /  TBili  0.6  /  DBili  x   /  AST  24  /  ALT  25  /  AlkPhos  56      Urinalysis Basic - ( 2018 09:49 )    Color: PLYEL / Appearance: CLEAR / S.016 / pH: 6.5  Gluc: NEGATIVE / Ketone: NEGATIVE  / Bili: NEGATIVE / Urobili: NORMAL mg/dL   Blood: NEGATIVE / Protein: 20 mg/dL / Nitrite: NEGATIVE   Leuk Esterase: NEGATIVE / RBC: 0-2 / WBC 0-2   Sq Epi: x / Non Sq Epi: x / Bacteria: x    Spinal Fluid Differential (18 @ 18:31)    Total Cells Counted, Spinal Fluid: 100 cells    Seg Count, CSF: 2 %    Lymphocyte Count, CSF: 90 %    Mono - Spinal Fluid: 8 %    Cerebrospinal Fluid Cell Count-1 (18 @ 18:31)    Total Nucleated Cell Count, CSF: 431 cell/uL    Xanthochromia: ABSENT    CSF Clarity: CLEAR    RBC Count - Spinal Fluid: 111: Red Cell count correlates with the number and proportion of  cells on cytospin preparation. cell/uL    CSF Color: COLORLESS    Cerebrospinal Fluid Cell Count-1 (18 @ 18:22)    CSF Clarity: HAZY    RBC Count - Spinal Fluid: 3500: Red Cell count correlates with the number and proportion of  cells on cytospin preparation. cell/uL    Total Nucleated Cell Count, CSF: 319 cell/uL    Xanthochromia: ABSENT    CSF Color: PINK    CSF PCR Panel (18 @ 18:22)    Herpes simplex virus 2: DETECTED    Protein, CSF: 132.0 mg/dL (18 @ 18:22)  Glucose, CSF: 61 mg/dL (18 @ 18:22)        CSF Color: COLORLESS (18 @ 18:31)  CSF Color: PINK (18 @ 18:22)    MICROBIOLOGY:    Culture - CSF with Gram Stain (collected 2018 19:32)  Source: CEREBRAL SPINAL FLUID  Preliminary Report (2018 07:36):    CULTURE IN PROGRESS, FURTHER REPORT TO FOLLOW.    Culture - Urine (collected 2018 11:37)  Source: URINE MIDSTREAM  Final Report (2018 08:27):    NO GROWTH AT 24 HOURS          v  RADIOLOGY:    < from: CT Angio Head w/ IV Cont (18 @ 13:48) >  HEAD CTA:  The internal carotid arteries demonstrate normal enhancement to the   intracranial circulation and [place]. Anterior and middle   cerebral arteries demonstrate normal enhancement and symmetric   arborization without intraluminal filling defect, stenosis, occlusion,   aneurysm or vascular malformation.    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 The left intradural vertebral arteries   demonstrate normal enhancement to the vertebrobasilar confluence.   Otherwise, the branch vasculature ofthe posterior circulation are within   normal limits. The posterior cerebral arteries demonstrate symmetric   enhancement and arborization without evidence for aneurysm, stenosis,   occlusion or vascular malformation.    The anterior and left posterior communicating arteries are visualized and   normal.    Visualized dural venous sinuses and deep cerebral venous structures   demonstrate normal enhancement without evidence for filling defect.    POSTCONTRAST HEAD CT:  Motion degraded imaging decreasing sensitivity for full evaluation.    Ventricles and sulci are within normal limits.     There are no areas of abnormal enhancement within the brain parenchyma.   There is preservation of the corticomedullary junction without evidence   for transcortical territorial infarction.    There is nonspecific mild medial divergent gaze of the left globe,   otherwise the visualized orbits unremarkable.    The calvarium is intact.    Minimal mucosal thickening of the inferior left maxillary sinus,   otherwise the remaining visualized paranasal sinuses and tympanomastoid   cavities are clear.    IMPRESSION:    HEAD CTA:  No evidence for occlusion or aneurysm.    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    POSTCONTRAST HEAD CT:  No acute intracranial pathology or abnormal enhancement.    < from: CT Head No Cont (18 @ 10:06) >  FINDINGS: Ventricles are within normal limits of size. Sulci are   unremarkable with possible slight effacement near the biparietal   occipital region which may reflect partial volume averaging and   technique.. There is no intra or extra-axial hemorrhage, intracranial   hematoma, mass effect or midline shift. No abnormal extra-axial fluid   collections are present.  MRI is more sensitive for a leptomeningeal   process  and may be obtained  as clinically warranted. Basal cisterns are   patent. Pituitary is unremarkable. The calvarium is intact. The   visualized intraorbital compartments, paranasal sinuses and mastoid   complexes appear free of acute disease.    IMPRESSION:     No obvious acute hemorrhage or midline shift, if symptoms persist   consider follow-up CT or MRI if no contraindications. Discussed with Dr. Ortega at immediate time of review on 18 at 10:20 AM.    < from: Xray Chest 2 Views PA/Lat (18 @ 08:56) >    IMPRESSION:  Clear lungs. Nopleural effusions or pneumothorax.    Cardiac and mediastinal silhouettes within normal limits.    Trachea midline.    Unremarkable osseous structures. Infectious Diseases Consult note  Patient is a 28y old  Male who presents with a chief complaint of Meninigitis (2018 22:53)    TOYA RODRIGUEZ  MRN-[id number]  HPI:  28 male with wrestlers herpes- 10 years now here with b/l frontal pulsating headache since . Associated with  nausea, fevers to 100.5 at home, lower back pain, photophobia, phonophobia. He assumed it was a migraine and took advil with no relief of his sx's.  Patient denies any rash, trauma, CP, SOB, chest palpitations, abdominal pain, dysuria, hematuria, joint pain, diarrhea, vomiting. Denies any prior hx of HA's or migraines. States he was first diagnosed with wrestler herpes behind his L knee in . States he hasn't had an outbreak in over a year. Denies any genital lesions, mouth lesions. T max 102.6 s/p solumedrol x 1 dose and Ceftriaxone 2g x 1 dose and  now on Acyclovir. Lumbar puncture was done showed elevated protein and many cells with rbcs PCR positive for herpes. No sick contacts. No recent travel. No recent antibiotics. mother at bedside.     REVIEW OF SYSTEMS  All as below unless noted otherwise  General:  Denies fever and chills. 	  Ophthalmologic: Denies any visual complaints. 	  ENMT: No throat pain.  Respiratory: No cough, sputum. No shortness of breath.   Cardiovascular: No chest pain.   Gastrointestinal: No nausea or vomiting. No abdominal pain. No diarrhea.   Genitourinary: No burning urine, no frequency. No flank pain  Musculoskeletal: Has lower back pain- can't walk sec to pain  Neurological: No confusion. has some neck stiffness. 	  Skin: No rash    PAST MEDICAL & SURGICAL HISTORY:  Herpes simplex type 2 infection  No significant past surgical history      FAMILY HISTORY:  Family history of diabetes mellitus (Grandparent)      SOCIAL HISTORY:    current 1 pack per month smoker for 10 years.   Occasional ETOH use, <2 drinks a week,   endorses occasional Cocaine use.   Sexually active, multiple partners, uses condoms consistently.   Prior hx of chlamydia infection 3 years ago    ANTIMICROBIALS:    acyclovir IVPB 800 every 8 hours    OTHER MEDS:    acetaminophen   Tablet 650 milliGRAM(s) Oral every 6 hours PRN  acetaminophen   Tablet. 650 milliGRAM(s) Oral every 6 hours PRN  melatonin 3 milliGRAM(s) Oral at bedtime  metoclopramide Injectable 10 milliGRAM(s) IV Push every 8 hours PRN  morphine  - Injectable 2 milliGRAM(s) IV Push every 4 hours PRN  sodium chloride 0.9%. 1000 milliLiter(s) IV Continuous <Continuous>    Allergies  No Known Allergies    Vital Signs Last 24 Hrs  T(C): 36.8 (2018 04:23), Max: 39.2 (2018 16:41)  T(F): 98.2 (2018 04:23), Max: 102.6 (2018 16:41)  HR: 66 (2018 05:58) (66 - 107)  BP: 134/77 (2018 05:58) (126/80 - 157/80)  RR: 16 (2018 04:23) (16 - 19)  SpO2: 100% (2018 04:23) (98% - 100%)  Daily Height in cm: 175.26 (2018 22:01)        PHYSICAL EXAM:  patient in no acute respiratory distress.  Constitutional: Comfortable. Awake and alert  Eyes: PERRL EOMI. No discharge.- some photophobia  ENMT: No sinus tenderness.  No pharyngeal erythema.   Neck: some neck stiffness. No thyromegaly   Respiratory:  air entry bilaterally, no wheezes, rhonchi, or crackles  Cardiovascular:S1 S2 wnl, No murmurs  Gastrointestinal: Soft, no tenderness , bowel sounds present,   Genitourinary: No suprapubic tenderness. no CVA tenderness   Musculoskeletal: Lower  - No joint swelling. No edema.  Vascular: peripheral pulses felt  Neurological: No grossly focal deficits  Skin: No rash   Lymph Nodes: No palpable Lymphadenopathy   Psychiatric: Affect normal  Lines:                         13.7   11.81 )-----------( 217      ( 2018 06:03 )             40.3     WBC Count: 11.81 ( @ 06:03)  WBC Count: 10.03 ( @ 08:32)        139  |  103  |  6<L>  ----------------------------<  91  3.8   |  24  |  1.14    Ca    8.8      2018 06:03  Phos  3.0       Mg     1.7         TPro  7.4  /  Alb  4.6  /  TBili  0.6  /  DBili  x   /  AST  24  /  ALT  25  /  AlkPhos  56      Urinalysis Basic - ( 2018 09:49 )    Color: PLYEL / Appearance: CLEAR / S.016 / pH: 6.5  Gluc: NEGATIVE / Ketone: NEGATIVE  / Bili: NEGATIVE / Urobili: NORMAL mg/dL   Blood: NEGATIVE / Protein: 20 mg/dL / Nitrite: NEGATIVE   Leuk Esterase: NEGATIVE / RBC: 0-2 / WBC 0-2   Sq Epi: x / Non Sq Epi: x / Bacteria: x    Spinal Fluid Differential (18 @ 18:31)    Total Cells Counted, Spinal Fluid: 100 cells    Seg Count, CSF: 2 %    Lymphocyte Count, CSF: 90 %    Mono - Spinal Fluid: 8 %    Cerebrospinal Fluid Cell Count-1 (18 @ 18:31)    Total Nucleated Cell Count, CSF: 431 cell/uL    Xanthochromia: ABSENT    CSF Clarity: CLEAR    RBC Count - Spinal Fluid: 111: Red Cell count correlates with the number and proportion of  cells on cytospin preparation. cell/uL    CSF Color: COLORLESS    Cerebrospinal Fluid Cell Count-1 (18 @ 18:22)    CSF Clarity: HAZY    RBC Count - Spinal Fluid: 3500: Red Cell count correlates with the number and proportion of  cells on cytospin preparation. cell/uL    Total Nucleated Cell Count, CSF: 319 cell/uL    Xanthochromia: ABSENT    CSF Color: PINK    CSF PCR Panel (18 @ 18:22)    Herpes simplex virus 2: DETECTED    Protein, CSF: 132.0 mg/dL (18 @ 18:22)  Glucose, CSF: 61 mg/dL (18 @ 18:22)        CSF Color: COLORLESS (18 @ 18:31)  CSF Color: PINK (18 @ 18:22)    MICROBIOLOGY:    Culture - CSF with Gram Stain (collected 2018 19:32)  Source: CEREBRAL SPINAL FLUID  Preliminary Report (2018 07:36):    CULTURE IN PROGRESS, FURTHER REPORT TO FOLLOW.    Culture - Urine (collected 2018 11:37)  Source: URINE MIDSTREAM  Final Report (2018 08:27):    NO GROWTH AT 24 HOURS          v  RADIOLOGY:    < from: CT Angio Head w/ IV Cont (18 @ 13:48) >  HEAD CTA:  The internal carotid arteries demonstrate normal enhancement to the   intracranial circulation and [place]. Anterior and middle   cerebral arteries demonstrate normal enhancement and symmetric   arborization without intraluminal filling defect, stenosis, occlusion,   aneurysm or vascular malformation.    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 The left intradural vertebral arteries   demonstrate normal enhancement to the vertebrobasilar confluence.   Otherwise, the branch vasculature ofthe posterior circulation are within   normal limits. The posterior cerebral arteries demonstrate symmetric   enhancement and arborization without evidence for aneurysm, stenosis,   occlusion or vascular malformation.    The anterior and left posterior communicating arteries are visualized and   normal.    Visualized dural venous sinuses and deep cerebral venous structures   demonstrate normal enhancement without evidence for filling defect.    POSTCONTRAST HEAD CT:  Motion degraded imaging decreasing sensitivity for full evaluation.    Ventricles and sulci are within normal limits.     There are no areas of abnormal enhancement within the brain parenchyma.   There is preservation of the corticomedullary junction without evidence   for transcortical territorial infarction.    There is nonspecific mild medial divergent gaze of the left globe,   otherwise the visualized orbits unremarkable.    The calvarium is intact.    Minimal mucosal thickening of the inferior left maxillary sinus,   otherwise the remaining visualized paranasal sinuses and tympanomastoid   cavities are clear.    IMPRESSION:    HEAD CTA:  No evidence for occlusion or aneurysm.    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    POSTCONTRAST HEAD CT:  No acute intracranial pathology or abnormal enhancement.    < from: CT Head No Cont (18 @ 10:06) >  FINDINGS: Ventricles are within normal limits of size. Sulci are   unremarkable with possible slight effacement near the biparietal   occipital region which may reflect partial volume averaging and   technique.. There is no intra or extra-axial hemorrhage, intracranial   hematoma, mass effect or midline shift. No abnormal extra-axial fluid   collections are present.  MRI is more sensitive for a leptomeningeal   process  and may be obtained  as clinically warranted. Basal cisterns are   patent. Pituitary is unremarkable. The calvarium is intact. The   visualized intraorbital compartments, paranasal sinuses and mastoid   complexes appear free of acute disease.    IMPRESSION:     No obvious acute hemorrhage or midline shift, if symptoms persist   consider follow-up CT or MRI if no contraindications. Discussed with Dr. Ortega at immediate time of review on 18 at 10:20 AM.    < from: Xray Chest 2 Views PA/Lat (18 @ 08:56) >    IMPRESSION:  Clear lungs. Nopleural effusions or pneumothorax.    Cardiac and mediastinal silhouettes within normal limits.    Trachea midline.    Unremarkable osseous structures.

## 2018-04-29 LAB
BASOPHILS # BLD AUTO: 0.04 K/UL — SIGNIFICANT CHANGE UP (ref 0–0.2)
BASOPHILS NFR BLD AUTO: 0.5 % — SIGNIFICANT CHANGE UP (ref 0–2)
BUN SERPL-MCNC: 8 MG/DL — SIGNIFICANT CHANGE UP (ref 7–23)
CALCIUM SERPL-MCNC: 8.5 MG/DL — SIGNIFICANT CHANGE UP (ref 8.4–10.5)
CHLORIDE SERPL-SCNC: 101 MMOL/L — SIGNIFICANT CHANGE UP (ref 98–107)
CO2 SERPL-SCNC: 24 MMOL/L — SIGNIFICANT CHANGE UP (ref 22–31)
CREAT SERPL-MCNC: 1.07 MG/DL — SIGNIFICANT CHANGE UP (ref 0.5–1.3)
EOSINOPHIL # BLD AUTO: 0.1 K/UL — SIGNIFICANT CHANGE UP (ref 0–0.5)
EOSINOPHIL NFR BLD AUTO: 1.3 % — SIGNIFICANT CHANGE UP (ref 0–6)
GLUCOSE SERPL-MCNC: 91 MG/DL — SIGNIFICANT CHANGE UP (ref 70–99)
HCT VFR BLD CALC: 40.3 % — SIGNIFICANT CHANGE UP (ref 39–50)
HGB BLD-MCNC: 13.9 G/DL — SIGNIFICANT CHANGE UP (ref 13–17)
HIV 1+2 AB+HIV1 P24 AG SERPL QL IA: SIGNIFICANT CHANGE UP
IMM GRANULOCYTES # BLD AUTO: 0.02 # — SIGNIFICANT CHANGE UP
IMM GRANULOCYTES NFR BLD AUTO: 0.3 % — SIGNIFICANT CHANGE UP (ref 0–1.5)
LYMPHOCYTES # BLD AUTO: 2.18 K/UL — SIGNIFICANT CHANGE UP (ref 1–3.3)
LYMPHOCYTES # BLD AUTO: 28 % — SIGNIFICANT CHANGE UP (ref 13–44)
MAGNESIUM SERPL-MCNC: 1.8 MG/DL — SIGNIFICANT CHANGE UP (ref 1.6–2.6)
MCHC RBC-ENTMCNC: 30.3 PG — SIGNIFICANT CHANGE UP (ref 27–34)
MCHC RBC-ENTMCNC: 34.5 % — SIGNIFICANT CHANGE UP (ref 32–36)
MCV RBC AUTO: 87.8 FL — SIGNIFICANT CHANGE UP (ref 80–100)
MONOCYTES # BLD AUTO: 0.68 K/UL — SIGNIFICANT CHANGE UP (ref 0–0.9)
MONOCYTES NFR BLD AUTO: 8.7 % — SIGNIFICANT CHANGE UP (ref 2–14)
NEUTROPHILS # BLD AUTO: 4.77 K/UL — SIGNIFICANT CHANGE UP (ref 1.8–7.4)
NEUTROPHILS NFR BLD AUTO: 61.2 % — SIGNIFICANT CHANGE UP (ref 43–77)
NRBC # FLD: 0 — SIGNIFICANT CHANGE UP
PHOSPHATE SERPL-MCNC: 4.3 MG/DL — SIGNIFICANT CHANGE UP (ref 2.5–4.5)
PLATELET # BLD AUTO: 209 K/UL — SIGNIFICANT CHANGE UP (ref 150–400)
PMV BLD: 9 FL — SIGNIFICANT CHANGE UP (ref 7–13)
POTASSIUM SERPL-MCNC: 3.9 MMOL/L — SIGNIFICANT CHANGE UP (ref 3.5–5.3)
POTASSIUM SERPL-SCNC: 3.9 MMOL/L — SIGNIFICANT CHANGE UP (ref 3.5–5.3)
RBC # BLD: 4.59 M/UL — SIGNIFICANT CHANGE UP (ref 4.2–5.8)
RBC # FLD: 12.4 % — SIGNIFICANT CHANGE UP (ref 10.3–14.5)
SODIUM SERPL-SCNC: 139 MMOL/L — SIGNIFICANT CHANGE UP (ref 135–145)
WBC # BLD: 7.79 K/UL — SIGNIFICANT CHANGE UP (ref 3.8–10.5)
WBC # FLD AUTO: 7.79 K/UL — SIGNIFICANT CHANGE UP (ref 3.8–10.5)

## 2018-04-29 PROCEDURE — 99233 SBSQ HOSP IP/OBS HIGH 50: CPT | Mod: GC

## 2018-04-29 PROCEDURE — 99232 SBSQ HOSP IP/OBS MODERATE 35: CPT | Mod: GC

## 2018-04-29 RX ORDER — SENNA PLUS 8.6 MG/1
2 TABLET ORAL AT BEDTIME
Qty: 0 | Refills: 0 | Status: DISCONTINUED | OUTPATIENT
Start: 2018-04-29 | End: 2018-05-01

## 2018-04-29 RX ORDER — DOCUSATE SODIUM 100 MG
100 CAPSULE ORAL
Qty: 0 | Refills: 0 | Status: DISCONTINUED | OUTPATIENT
Start: 2018-04-29 | End: 2018-05-01

## 2018-04-29 RX ORDER — POLYETHYLENE GLYCOL 3350 17 G/17G
17 POWDER, FOR SOLUTION ORAL ONCE
Qty: 0 | Refills: 0 | Status: COMPLETED | OUTPATIENT
Start: 2018-04-29 | End: 2018-04-29

## 2018-04-29 RX ADMIN — Medication 266 MILLIGRAM(S): at 06:30

## 2018-04-29 RX ADMIN — POLYETHYLENE GLYCOL 3350 17 GRAM(S): 17 POWDER, FOR SOLUTION ORAL at 13:29

## 2018-04-29 RX ADMIN — Medication 3 MILLIGRAM(S): at 22:53

## 2018-04-29 RX ADMIN — MORPHINE SULFATE 2 MILLIGRAM(S): 50 CAPSULE, EXTENDED RELEASE ORAL at 23:09

## 2018-04-29 RX ADMIN — LIDOCAINE 1 PATCH: 4 CREAM TOPICAL at 23:04

## 2018-04-29 RX ADMIN — LIDOCAINE 1 PATCH: 4 CREAM TOPICAL at 13:35

## 2018-04-29 RX ADMIN — Medication 100 MILLIGRAM(S): at 17:03

## 2018-04-29 RX ADMIN — MORPHINE SULFATE 2 MILLIGRAM(S): 50 CAPSULE, EXTENDED RELEASE ORAL at 04:38

## 2018-04-29 RX ADMIN — MORPHINE SULFATE 2 MILLIGRAM(S): 50 CAPSULE, EXTENDED RELEASE ORAL at 22:54

## 2018-04-29 RX ADMIN — MORPHINE SULFATE 2 MILLIGRAM(S): 50 CAPSULE, EXTENDED RELEASE ORAL at 04:53

## 2018-04-29 RX ADMIN — Medication 266 MILLIGRAM(S): at 13:28

## 2018-04-29 RX ADMIN — LIDOCAINE 1 PATCH: 4 CREAM TOPICAL at 01:00

## 2018-04-29 RX ADMIN — SODIUM CHLORIDE 150 MILLILITER(S): 9 INJECTION INTRAMUSCULAR; INTRAVENOUS; SUBCUTANEOUS at 22:54

## 2018-04-29 RX ADMIN — Medication 266 MILLIGRAM(S): at 22:54

## 2018-04-29 NOTE — PROGRESS NOTE ADULT - SUBJECTIVE AND OBJECTIVE BOX
Infectious Diseases Follow up note   [id number]    F/u: Herpes meningitis    Interval History:      Review of systems:  General: no fever or chills.  HEENT: no sore throat.   Respiratory: no shortness of breath. no cough.  Cardiovascular: no chest pain. no palpitations.   Gastrointestinal :no nausea or vomiting. no abdominal pain. no diarrhea.   Genitourinary: no burning urine.   Musculoskeletal: no joint pain.  Lymphatic system: no swollen lymph nodes.   Skin: no rash.   Neurological: no headache.     Allergies  No Known Allergies      ANTIMICROBIALS:    acyclovir IVPB 800 every 8 hours    acetaminophen   Tablet 650 milliGRAM(s) Oral every 6 hours PRN  acetaminophen   Tablet. 650 milliGRAM(s) Oral every 6 hours PRN  acyclovir IVPB 800 milliGRAM(s) IV Intermittent every 8 hours  cyclobenzaprine 5 milliGRAM(s) Oral three times a day PRN  docusate sodium 100 milliGRAM(s) Oral two times a day  lidocaine   Patch 1 Patch Transdermal daily  melatonin 3 milliGRAM(s) Oral at bedtime  metoclopramide Injectable 10 milliGRAM(s) IV Push every 8 hours PRN  morphine  - Injectable 2 milliGRAM(s) IV Push every 4 hours PRN  senna 2 Tablet(s) Oral at bedtime  sodium chloride 0.9%. 1000 milliLiter(s) IV Continuous <Continuous>    Vital Signs Last 24 Hrs  T(C): 36.8 (29 Apr 2018 06:26), Max: 37.6 (29 Apr 2018 04:38)  T(F): 98.2 (29 Apr 2018 06:26), Max: 99.6 (29 Apr 2018 04:38)  HR: 70 (29 Apr 2018 06:26) (61 - 72)  BP: 114/68 (29 Apr 2018 06:26) (114/68 - 142/78)  RR: 18 (29 Apr 2018 06:26) (18 - 19)  SpO2: 100% (29 Apr 2018 06:26) (97% - 100%)    PHYSICAL EXAM:  General:  non-toxic  HEAD/EYES:  PERRL  white sclera  ENT:  normal  supple  Cardiovascular:   no murmur   Respiratory:   clear to ausculation bilaterally  GI:   soft, non-tender, normal bowel sounds  :   no CVA tenderness   Musculoskeletal:  no synovitis  Neurologic:   non-focal exam   Skin:   no rash  Lymph:  no lymphadenopathy  Psychiatric:   appropriate affect, alert & oriented  Lines:  no phlebitis                        13.9   7.79  )-----------( 209      ( 29 Apr 2018 06:04 )             40.3     WBC Count: 7.79 (04-29 @ 06:04)  WBC Count: 11.81 (04-28 @ 06:03)  WBC Count: 10.03 (04-27 @ 08:32)    04-29    139  |  101  |  8   ----------------------------<  91  3.9   |  24  |  1.07    Ca    8.5      29 Apr 2018 06:04  Phos  4.3     04-29  Mg     1.8     04-29      Microbiology:     Culture - CSF with Gram Stain (collected 27 Apr 2018 19:32)  Source: CEREBRAL SPINAL FLUID  Preliminary Report (29 Apr 2018 08:08):    NO GROWTH - PRELIMINARY RESULTS    NO ORGANISMS ISOLATED AT 24 HOURS    Culture - Blood (collected 27 Apr 2018 18:57)  Source: BLOOD VENOUS  Preliminary Report (28 Apr 2018 19:10):    NO ORGANISMS ISOLATED    NO ORGANISMS ISOLATED AT 24 HOURS    Culture - Blood (collected 27 Apr 2018 17:23)  Source: BLOOD PERIPHERAL  Preliminary Report (28 Apr 2018 17:23):    NO ORGANISMS ISOLATED    NO ORGANISMS ISOLATED AT 24 HOURS    Culture - Urine (collected 27 Apr 2018 11:37)  Source: URINE MIDSTREAM  Final Report (28 Apr 2018 08:27):    NO GROWTH AT 24 HOURS    HIV-1/2 Antigen/Antibody Screen by [facility] (04.28.18 @ 06:03)    HIV-1/2 Combo Result: Nonreactive The HIV Ag/Ab Combo test performed screens for HIV-1 p24  antigen, antibodies to HIV-1 (group M and group O), and  antibodies to HIV-2. All specimens repeatedly reactive  will reflex to an HIV 1/2 antibody confirmation and  differentiation test. This assay detects p24 antigen which  may be present prior to the development of HIV antibodies,  therefore a reactive result with a negative HIV 1/2 AB  Confirmation should be followed up with HIV-1 RNA, HIV-2  RNA and repeat testing in 4-8 weeks. A nonreactive result  does not preclude previous exposure to or infection with  HIV-1 or HIV-2. [facility] prohibits disclosure of this  result to any unauthorized party.      v  RADIOLOGY:    none new Infectious Diseases Follow up note   [id number]    F/u: Herpes meningitis    Interval History:  he is feeling better. back pain is improved. no longer has neck stiffness.   mother at bedside       Review of systems:  General: no fever or chills.  HEENT: no sore throat.   Respiratory: no shortness of breath. no cough.  Cardiovascular: no chest pain. no palpitations.   Gastrointestinal :no nausea or vomiting. no abdominal pain. no diarrhea.   Genitourinary: no burning urine.   Musculoskeletal: no joint pain.  Lymphatic system: no swollen lymph nodes.   Skin: no rash.   Neurological: no headache.     Allergies  No Known Allergies      ANTIMICROBIALS:    acyclovir IVPB 800 every 8 hours    acetaminophen   Tablet 650 milliGRAM(s) Oral every 6 hours PRN  acetaminophen   Tablet. 650 milliGRAM(s) Oral every 6 hours PRN  acyclovir IVPB 800 milliGRAM(s) IV Intermittent every 8 hours  cyclobenzaprine 5 milliGRAM(s) Oral three times a day PRN  docusate sodium 100 milliGRAM(s) Oral two times a day  lidocaine   Patch 1 Patch Transdermal daily  melatonin 3 milliGRAM(s) Oral at bedtime  metoclopramide Injectable 10 milliGRAM(s) IV Push every 8 hours PRN  morphine  - Injectable 2 milliGRAM(s) IV Push every 4 hours PRN  senna 2 Tablet(s) Oral at bedtime  sodium chloride 0.9%. 1000 milliLiter(s) IV Continuous <Continuous>    Vital Signs Last 24 Hrs  T(C): 36.8 (29 Apr 2018 06:26), Max: 37.6 (29 Apr 2018 04:38)  T(F): 98.2 (29 Apr 2018 06:26), Max: 99.6 (29 Apr 2018 04:38)  HR: 70 (29 Apr 2018 06:26) (61 - 72)  BP: 114/68 (29 Apr 2018 06:26) (114/68 - 142/78)  RR: 18 (29 Apr 2018 06:26) (18 - 19)  SpO2: 100% (29 Apr 2018 06:26) (97% - 100%)    PHYSICAL EXAM:  General:  non-toxic  HEAD/EYES:  PERRL  white sclera  ENT:  normal  supple  Cardiovascular:   no murmur   Respiratory:   clear to ausculation bilaterally  GI:   soft, non-tender, normal bowel sounds  :   no CVA tenderness   Musculoskeletal:  no synovitis  Neurologic:   non-focal exam   Skin:   no rash  Lymph:  no lymphadenopathy  Psychiatric:   appropriate affect, alert & oriented  Lines:  no phlebitis                        13.9   7.79  )-----------( 209      ( 29 Apr 2018 06:04 )             40.3     WBC Count: 7.79 (04-29 @ 06:04)  WBC Count: 11.81 (04-28 @ 06:03)  WBC Count: 10.03 (04-27 @ 08:32)    04-29    139  |  101  |  8   ----------------------------<  91  3.9   |  24  |  1.07    Ca    8.5      29 Apr 2018 06:04  Phos  4.3     04-29  Mg     1.8     04-29      Microbiology:     Culture - CSF with Gram Stain (collected 27 Apr 2018 19:32)  Source: CEREBRAL SPINAL FLUID  Preliminary Report (29 Apr 2018 08:08):    NO GROWTH - PRELIMINARY RESULTS    NO ORGANISMS ISOLATED AT 24 HOURS    Culture - Blood (collected 27 Apr 2018 18:57)  Source: BLOOD VENOUS  Preliminary Report (28 Apr 2018 19:10):    NO ORGANISMS ISOLATED    NO ORGANISMS ISOLATED AT 24 HOURS    Culture - Blood (collected 27 Apr 2018 17:23)  Source: BLOOD PERIPHERAL  Preliminary Report (28 Apr 2018 17:23):    NO ORGANISMS ISOLATED    NO ORGANISMS ISOLATED AT 24 HOURS    Culture - Urine (collected 27 Apr 2018 11:37)  Source: URINE MIDSTREAM  Final Report (28 Apr 2018 08:27):    NO GROWTH AT 24 HOURS    HIV-1/2 Antigen/Antibody Screen by CMIA (04.28.18 @ 06:03)    HIV-1/2 Combo Result: Nonreactive The HIV Ag/Ab Combo test performed screens for HIV-1 p24  antigen, antibodies to HIV-1 (group M and group O), and  antibodies to HIV-2. All specimens repeatedly reactive  will reflex to an HIV 1/2 antibody confirmation and  differentiation test. This assay detects p24 antigen which  may be present prior to the development of HIV antibodies,  therefore a reactive result with a negative HIV 1/2 AB  Confirmation should be followed up with HIV-1 RNA, HIV-2  RNA and repeat testing in 4-8 weeks. A nonreactive result  does not preclude previous exposure to or infection with  HIV-1 or HIV-2. [facility] prohibits disclosure of this  result to any unauthorized party.      v  RADIOLOGY:    none new Infectious Diseases Follow up note   [id number]    F/u: Herpes meningitis    Interval History:  he is feeling better. back pain is improved. no longer has neck stiffness.   mother at bedside       Review of systems:  General: no fever or chills.  HEENT: no sore throat.   Respiratory: no shortness of breath. no cough.  Cardiovascular: no chest pain. no palpitations.   Gastrointestinal :no nausea or vomiting. no abdominal pain. no diarrhea.   Genitourinary: no burning urine.   Musculoskeletal: no joint pain.  Lymphatic system: no swollen lymph nodes.   Skin: no rash.   Neurological: no headache.     Allergies  No Known Allergies      ANTIMICROBIALS:    acyclovir IVPB 800 every 8 hours    acetaminophen   Tablet 650 milliGRAM(s) Oral every 6 hours PRN  acetaminophen   Tablet. 650 milliGRAM(s) Oral every 6 hours PRN  acyclovir IVPB 800 milliGRAM(s) IV Intermittent every 8 hours  cyclobenzaprine 5 milliGRAM(s) Oral three times a day PRN  docusate sodium 100 milliGRAM(s) Oral two times a day  lidocaine   Patch 1 Patch Transdermal daily  melatonin 3 milliGRAM(s) Oral at bedtime  metoclopramide Injectable 10 milliGRAM(s) IV Push every 8 hours PRN  morphine  - Injectable 2 milliGRAM(s) IV Push every 4 hours PRN  senna 2 Tablet(s) Oral at bedtime  sodium chloride 0.9%. 1000 milliLiter(s) IV Continuous <Continuous>    Vital Signs Last 24 Hrs  T(C): 36.8 (29 Apr 2018 06:26), Max: 37.6 (29 Apr 2018 04:38)  T(F): 98.2 (29 Apr 2018 06:26), Max: 99.6 (29 Apr 2018 04:38)  HR: 70 (29 Apr 2018 06:26) (61 - 72)  BP: 114/68 (29 Apr 2018 06:26) (114/68 - 142/78)  RR: 18 (29 Apr 2018 06:26) (18 - 19)  SpO2: 100% (29 Apr 2018 06:26) (97% - 100%)    PHYSICAL EXAM:  General:  non-toxic  HEAD/EYES:  PERRL  white sclera  ENT:  normal  supple  Cardiovascular:   no murmur   Respiratory:   clear to ausculation bilaterally  GI:   soft, non-tender, normal bowel sounds  :   no CVA tenderness   Musculoskeletal:  no synovitis  Neurologic:   non-focal exam   Skin:   no rash  Lymph:  no lymphadenopathy  Psychiatric:   appropriate affect, alert & oriented  Lines:  no phlebitis                        13.9   7.79  )-----------( 209      ( 29 Apr 2018 06:04 )             40.3     WBC Count: 7.79 (04-29 @ 06:04)  WBC Count: 11.81 (04-28 @ 06:03)  WBC Count: 10.03 (04-27 @ 08:32)    04-29    139  |  101  |  8   ----------------------------<  91  3.9   |  24  |  1.07    Ca    8.5      29 Apr 2018 06:04  Phos  4.3     04-29  Mg     1.8     04-29      Microbiology:     Culture - CSF with Gram Stain (collected 27 Apr 2018 19:32)  Source: CEREBRAL SPINAL FLUID  Preliminary Report (29 Apr 2018 08:08):    NO GROWTH - PRELIMINARY RESULTS    NO ORGANISMS ISOLATED AT 24 HOURS    Culture - Blood (collected 27 Apr 2018 18:57)  Source: BLOOD VENOUS  Preliminary Report (28 Apr 2018 19:10):    NO ORGANISMS ISOLATED    NO ORGANISMS ISOLATED AT 24 HOURS    Culture - Blood (collected 27 Apr 2018 17:23)  Source: BLOOD PERIPHERAL  Preliminary Report (28 Apr 2018 17:23):    NO ORGANISMS ISOLATED    NO ORGANISMS ISOLATED AT 24 HOURS    Culture - Urine (collected 27 Apr 2018 11:37)  Source: URINE MIDSTREAM  Final Report (28 Apr 2018 08:27):    NO GROWTH AT 24 HOURS    HIV-1/2 Antigen/Antibody Screen by [facility]IA (04.28.18 @ 06:03)    HIV-1/2 Combo Result: Nonreactive The HIV Ag/Ab Combo test performed screens for HIV-1 p24  antigen, antibodies to HIV-1 (group M and group O), and  antibodies to HIV-2. All specimens repeatedly reactive  will reflex to an HIV 1/2 antibody confirmation and  differentiation test. This assay detects p24 antigen which  may be present prior to the development of HIV antibodies,  therefore a reactive result with a negative HIV 1/2 AB  Confirmation should be followed up with HIV-1 RNA, HIV-2  RNA and repeat testing in 4-8 weeks. A nonreactive result  does not preclude previous exposure to or infection with  HIV-1 or HIV-2. [facility] prohibits disclosure of this  result to any unauthorized party.        RADIOLOGY:    none new

## 2018-04-29 NOTE — PROGRESS NOTE ADULT - ASSESSMENT
28< with PMHx of wrestlers herpes? 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

## 2018-04-29 NOTE — PROGRESS NOTE ADULT - PROBLEM SELECTOR PLAN 1
- patient presented with headaches and photophobia with LP revealing aseptic meningitis, with elevated protien, elevated lymphocytes and PCR positive for HSV 2  - ID recs appreciated: c/w acyclovir 10mg/kg q 8hrs for 10-14 days, c/w  cc/hr and monitor for kidney dysfunction  - will discuss with ID regarding duration of hospital stay, and if outpatient IV antibiotics are an option    - Pain control with morphine and tylenol  - Reglan for nausea.  - blood cultures negative and CSF gram stain/ culture neg/ NGTD - patient presented with headaches and photophobia with LP revealing aseptic meningitis, with elevated protien, elevated lymphocytes and PCR positive for HSV 2  - ID recs appreciated: c/w acyclovir 10mg/kg q 8hrs for 10-14 days, c/w  cc/hr and monitor for kidney dysfunction  - will discuss with ID regarding duration of hospital stay, and if outpatient oral or IV antibiotics are an option    - Pain control with morphine and tylenol  - Reglan for nausea.  - blood cultures negative and CSF gram stain/ culture neg/ NGTD

## 2018-04-30 ENCOUNTER — TRANSCRIPTION ENCOUNTER (OUTPATIENT)
Age: 28
End: 2018-04-30

## 2018-04-30 LAB
BASOPHILS # BLD AUTO: 0.05 K/UL — SIGNIFICANT CHANGE UP (ref 0–0.2)
BASOPHILS NFR BLD AUTO: 0.7 % — SIGNIFICANT CHANGE UP (ref 0–2)
BUN SERPL-MCNC: 11 MG/DL — SIGNIFICANT CHANGE UP (ref 7–23)
C TRACH RRNA SPEC QL NAA+PROBE: SIGNIFICANT CHANGE UP
CALCIUM SERPL-MCNC: 8.6 MG/DL — SIGNIFICANT CHANGE UP (ref 8.4–10.5)
CHLORIDE SERPL-SCNC: 103 MMOL/L — SIGNIFICANT CHANGE UP (ref 98–107)
CO2 SERPL-SCNC: 22 MMOL/L — SIGNIFICANT CHANGE UP (ref 22–31)
CREAT SERPL-MCNC: 1.08 MG/DL — SIGNIFICANT CHANGE UP (ref 0.5–1.3)
EOSINOPHIL # BLD AUTO: 0.16 K/UL — SIGNIFICANT CHANGE UP (ref 0–0.5)
EOSINOPHIL NFR BLD AUTO: 2.4 % — SIGNIFICANT CHANGE UP (ref 0–6)
GLUCOSE SERPL-MCNC: 93 MG/DL — SIGNIFICANT CHANGE UP (ref 70–99)
HCT VFR BLD CALC: 41.6 % — SIGNIFICANT CHANGE UP (ref 39–50)
HGB BLD-MCNC: 14.1 G/DL — SIGNIFICANT CHANGE UP (ref 13–17)
IMM GRANULOCYTES # BLD AUTO: 0.02 # — SIGNIFICANT CHANGE UP
IMM GRANULOCYTES NFR BLD AUTO: 0.3 % — SIGNIFICANT CHANGE UP (ref 0–1.5)
LYMPHOCYTES # BLD AUTO: 2.42 K/UL — SIGNIFICANT CHANGE UP (ref 1–3.3)
LYMPHOCYTES # BLD AUTO: 35.7 % — SIGNIFICANT CHANGE UP (ref 13–44)
MAGNESIUM SERPL-MCNC: 2 MG/DL — SIGNIFICANT CHANGE UP (ref 1.6–2.6)
MCHC RBC-ENTMCNC: 30.1 PG — SIGNIFICANT CHANGE UP (ref 27–34)
MCHC RBC-ENTMCNC: 33.9 % — SIGNIFICANT CHANGE UP (ref 32–36)
MCV RBC AUTO: 88.9 FL — SIGNIFICANT CHANGE UP (ref 80–100)
MONOCYTES # BLD AUTO: 0.7 K/UL — SIGNIFICANT CHANGE UP (ref 0–0.9)
MONOCYTES NFR BLD AUTO: 10.3 % — SIGNIFICANT CHANGE UP (ref 2–14)
N GONORRHOEA RRNA SPEC QL NAA+PROBE: SIGNIFICANT CHANGE UP
NEUTROPHILS # BLD AUTO: 3.43 K/UL — SIGNIFICANT CHANGE UP (ref 1.8–7.4)
NEUTROPHILS NFR BLD AUTO: 50.6 % — SIGNIFICANT CHANGE UP (ref 43–77)
NRBC # FLD: 0 — SIGNIFICANT CHANGE UP
PHOSPHATE SERPL-MCNC: 4.6 MG/DL — HIGH (ref 2.5–4.5)
PLATELET # BLD AUTO: 203 K/UL — SIGNIFICANT CHANGE UP (ref 150–400)
PMV BLD: 9.1 FL — SIGNIFICANT CHANGE UP (ref 7–13)
POTASSIUM SERPL-MCNC: 4 MMOL/L — SIGNIFICANT CHANGE UP (ref 3.5–5.3)
POTASSIUM SERPL-SCNC: 4 MMOL/L — SIGNIFICANT CHANGE UP (ref 3.5–5.3)
RBC # BLD: 4.68 M/UL — SIGNIFICANT CHANGE UP (ref 4.2–5.8)
RBC # FLD: 12.1 % — SIGNIFICANT CHANGE UP (ref 10.3–14.5)
SODIUM SERPL-SCNC: 140 MMOL/L — SIGNIFICANT CHANGE UP (ref 135–145)
SPECIMEN SOURCE: SIGNIFICANT CHANGE UP
WBC # BLD: 6.78 K/UL — SIGNIFICANT CHANGE UP (ref 3.8–10.5)
WBC # FLD AUTO: 6.78 K/UL — SIGNIFICANT CHANGE UP (ref 3.8–10.5)

## 2018-04-30 PROCEDURE — 99233 SBSQ HOSP IP/OBS HIGH 50: CPT | Mod: GC

## 2018-04-30 PROCEDURE — 99232 SBSQ HOSP IP/OBS MODERATE 35: CPT

## 2018-04-30 RX ADMIN — Medication 266 MILLIGRAM(S): at 22:37

## 2018-04-30 RX ADMIN — LIDOCAINE 1 PATCH: 4 CREAM TOPICAL at 20:47

## 2018-04-30 RX ADMIN — Medication 650 MILLIGRAM(S): at 15:59

## 2018-04-30 RX ADMIN — Medication 3 MILLIGRAM(S): at 22:38

## 2018-04-30 RX ADMIN — LIDOCAINE 1 PATCH: 4 CREAM TOPICAL at 12:37

## 2018-04-30 RX ADMIN — Medication 650 MILLIGRAM(S): at 10:33

## 2018-04-30 RX ADMIN — Medication 650 MILLIGRAM(S): at 16:59

## 2018-04-30 RX ADMIN — SODIUM CHLORIDE 150 MILLILITER(S): 9 INJECTION INTRAMUSCULAR; INTRAVENOUS; SUBCUTANEOUS at 22:37

## 2018-04-30 RX ADMIN — Medication 100 MILLIGRAM(S): at 05:44

## 2018-04-30 RX ADMIN — Medication 266 MILLIGRAM(S): at 13:05

## 2018-04-30 RX ADMIN — Medication 650 MILLIGRAM(S): at 09:33

## 2018-04-30 RX ADMIN — Medication 266 MILLIGRAM(S): at 05:44

## 2018-04-30 RX ADMIN — SODIUM CHLORIDE 150 MILLILITER(S): 9 INJECTION INTRAMUSCULAR; INTRAVENOUS; SUBCUTANEOUS at 09:33

## 2018-04-30 NOTE — DISCHARGE NOTE ADULT - PLAN OF CARE
Ongoing Care You were admitted to the hospital because you were found to have herpes meningitis. Please continue to take your valtrex as prescribed, and follow up with Dr. Ayers of infectious disease within 2 weeks of discharge. ongoing care Continue to take tylenol as needed for headaches. You were admitted to the hospital because you were found to have herpes meningitis. Please continue to take your valtrex as prescribed (3 times a day for 9 days) and follow up with Dr. Ayers of infectious disease within 2 weeks of discharge.

## 2018-04-30 NOTE — DISCHARGE NOTE ADULT - MEDICATION SUMMARY - MEDICATIONS TO TAKE
I will START or STAY ON the medications listed below when I get home from the hospital:    valACYclovir 1 g oral tablet  -- 1 tab(s) by mouth 3 times a day   -- It is very important that you take or use this exactly as directed.  Do not skip doses or discontinue unless directed by your doctor.    -- Indication: For Herpes simplex type 2 infection

## 2018-04-30 NOTE — PROGRESS NOTE ADULT - ASSESSMENT
28M with PMHx of wrestlers herpes? 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 28M with PMHx of wrestlers herpes? 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

## 2018-04-30 NOTE — DISCHARGE NOTE ADULT - CARE PLAN
Principal Discharge DX:	Meningitis due to herpes simplex virus  Goal:	Ongoing Care  Assessment and plan of treatment:	You were admitted to the hospital because you were found to have herpes meningitis. Please continue to take your valtrex as prescribed, and follow up with Dr. Ayers of infectious disease within 2 weeks of discharge.  Secondary Diagnosis:	Headache  Goal:	ongoing care  Assessment and plan of treatment:	Continue to take tylenol as needed for headaches. Principal Discharge DX:	Meningitis due to herpes simplex virus  Goal:	Ongoing Care  Assessment and plan of treatment:	You were admitted to the hospital because you were found to have herpes meningitis. Please continue to take your valtrex as prescribed (3 times a day for 9 days) and follow up with Dr. Ayers of infectious disease within 2 weeks of discharge.  Secondary Diagnosis:	Headache  Goal:	ongoing care  Assessment and plan of treatment:	Continue to take tylenol as needed for headaches.

## 2018-04-30 NOTE — DISCHARGE NOTE ADULT - CONDITIONS AT DISCHARGE
Mr Wlof is stable, improved and discharged.  He is alert , independent with his care, Headache is almost resolved 4/10 and tylenol is given.  No c/o Neck pain or light sensitivity at this time, he did receive his Acyclovir before being discharged.  back pain is resolved and he is given Discharge Instructions.

## 2018-04-30 NOTE — PROGRESS NOTE ADULT - SUBJECTIVE AND OBJECTIVE BOX
Samira Lyman PGY 1  Pager: [phone]/ [phone]  Patient is a 28y old  Male who presents with a chief complaint of Meninigitis (27 Apr 2018 22:53)      SUBJECTIVE / OVERNIGHT EVENTS:  Patient seen and examined at bedside. Patient with no acute events overnight and has remained afebrile. Patient with good urine output.     Other Review of Systems Negative.    MEDICATIONS  (STANDING):  acyclovir IVPB 800 milliGRAM(s) IV Intermittent every 8 hours  docusate sodium 100 milliGRAM(s) Oral two times a day  lidocaine   Patch 1 Patch Transdermal daily  melatonin 3 milliGRAM(s) Oral at bedtime  senna 2 Tablet(s) Oral at bedtime  sodium chloride 0.9%. 1000 milliLiter(s) (150 mL/Hr) IV Continuous <Continuous>    MEDICATIONS  (PRN):  acetaminophen   Tablet 650 milliGRAM(s) Oral every 6 hours PRN For Temp greater than 38 C (100.4 F)  acetaminophen   Tablet. 650 milliGRAM(s) Oral every 6 hours PRN mild to moderate pain  cyclobenzaprine 5 milliGRAM(s) Oral three times a day PRN Muscle Spasm  metoclopramide Injectable 10 milliGRAM(s) IV Push every 8 hours PRN Nausea/vomiting  morphine  - Injectable 2 milliGRAM(s) IV Push every 4 hours PRN Severe Pain (7 - 10)      OBJECTIVE:    Vital Signs Last 24 Hrs  T(C): 36.8 (30 Apr 2018 05:41), Max: 36.8 (29 Apr 2018 21:33)  T(F): 98.3 (30 Apr 2018 05:41), Max: 98.3 (29 Apr 2018 21:33)  HR: 56 (30 Apr 2018 05:41) (56 - 61)  BP: 100/61 (30 Apr 2018 05:41) (100/61 - 128/75)  BP(mean): --  RR: 18 (30 Apr 2018 05:41) (18 - 19)  SpO2: 99% (30 Apr 2018 05:41) (98% - 100%)    CAPILLARY BLOOD GLUCOSE        I&O's Summary      PHYSICAL EXAM:  GENERAL: NAD, well-developed  HEAD:  Atraumatic, Normocephalic  EYES: EOMI, PERRLA, conjunctiva and sclera clear  NECK: Supple, No JVD  CHEST/LUNG: Clear to auscultation bilaterally; No wheeze  HEART: Regular rate and rhythm; No murmurs, rubs, or gallops  ABDOMEN: Soft, Nontender, Nondistended; Bowel sounds present  EXTREMITIES:  2+ Peripheral Pulses, No clubbing, cyanosis, or edema  PSYCH: AAOx3  NEUROLOGY: non-focal  SKIN: No rashes or lesions    LABS:                        14.1   6.78  )-----------( 203      ( 30 Apr 2018 05:40 )             41.6     Auto Eosinophil # 0.16  / Auto Eosinophil % 2.4   / Auto Neutrophil # 3.43  / Auto Neutrophil % 50.6  / BANDS % x                            13.9   7.79  )-----------( 209      ( 29 Apr 2018 06:04 )             40.3     Auto Eosinophil # 0.10  / Auto Eosinophil % 1.3   / Auto Neutrophil # 4.77  / Auto Neutrophil % 61.2  / BANDS % x        04-30    140  |  103  |  11  ----------------------------<  93  4.0   |  22  |  1.08  04-29    139  |  101  |  8   ----------------------------<  91  3.9   |  24  |  1.07    Ca    8.6      30 Apr 2018 05:40  Mg     2.0     04-30  Phos  4.6     04-30                  ABG:     VBG:       Care Discussed with Consultants/Other Providers:    RADIOLOGY & ADDITIONAL TESTS:  (Imaging Personally Reviewed) Samira Lyman PGY 1  Pager: [phone]/ [phone]  Patient is a 28y old  Male who presents with a chief complaint of Meninigitis (27 Apr 2018 22:53)      SUBJECTIVE / OVERNIGHT EVENTS:  Patient seen and examined at bedside. Patient with no acute events overnight and has remained afebrile. Patient with good urine output. Denies worsened headache, fever, or chills.     Other Review of Systems Negative.    MEDICATIONS  (STANDING):  acyclovir IVPB 800 milliGRAM(s) IV Intermittent every 8 hours  docusate sodium 100 milliGRAM(s) Oral two times a day  lidocaine   Patch 1 Patch Transdermal daily  melatonin 3 milliGRAM(s) Oral at bedtime  senna 2 Tablet(s) Oral at bedtime  sodium chloride 0.9%. 1000 milliLiter(s) (150 mL/Hr) IV Continuous <Continuous>    MEDICATIONS  (PRN):  acetaminophen   Tablet 650 milliGRAM(s) Oral every 6 hours PRN For Temp greater than 38 C (100.4 F)  acetaminophen   Tablet. 650 milliGRAM(s) Oral every 6 hours PRN mild to moderate pain  cyclobenzaprine 5 milliGRAM(s) Oral three times a day PRN Muscle Spasm  metoclopramide Injectable 10 milliGRAM(s) IV Push every 8 hours PRN Nausea/vomiting  morphine  - Injectable 2 milliGRAM(s) IV Push every 4 hours PRN Severe Pain (7 - 10)      OBJECTIVE:    Vital Signs Last 24 Hrs  T(C): 36.8 (30 Apr 2018 05:41), Max: 36.8 (29 Apr 2018 21:33)  T(F): 98.3 (30 Apr 2018 05:41), Max: 98.3 (29 Apr 2018 21:33)  HR: 56 (30 Apr 2018 05:41) (56 - 61)  BP: 100/61 (30 Apr 2018 05:41) (100/61 - 128/75)  BP(mean): --  RR: 18 (30 Apr 2018 05:41) (18 - 19)  SpO2: 99% (30 Apr 2018 05:41) (98% - 100%)    CAPILLARY BLOOD GLUCOSE        I&O's Summary      PHYSICAL EXAM:  GENERAL: NAD, well-developed  HEAD:  Atraumatic, Normocephalic  EYES: EOMI, PERRLA, conjunctiva and sclera clear  NECK: Supple, No JVD  CHEST/LUNG: Clear to auscultation bilaterally; No wheeze  HEART: Regular rate and rhythm; No murmurs, rubs, or gallops  ABDOMEN: Soft, Nontender, Nondistended; Bowel sounds present  EXTREMITIES:  2+ Peripheral Pulses, No clubbing, cyanosis, or edema  PSYCH: AAOx3  NEUROLOGY: non-focal  SKIN: No rashes or lesions    LABS:                        14.1   6.78  )-----------( 203      ( 30 Apr 2018 05:40 )             41.6     Auto Eosinophil # 0.16  / Auto Eosinophil % 2.4   / Auto Neutrophil # 3.43  / Auto Neutrophil % 50.6  / BANDS % x                            13.9   7.79  )-----------( 209      ( 29 Apr 2018 06:04 )             40.3     Auto Eosinophil # 0.10  / Auto Eosinophil % 1.3   / Auto Neutrophil # 4.77  / Auto Neutrophil % 61.2  / BANDS % x        04-30    140  |  103  |  11  ----------------------------<  93  4.0   |  22  |  1.08  04-29    139  |  101  |  8   ----------------------------<  91  3.9   |  24  |  1.07    Ca    8.6      30 Apr 2018 05:40  Mg     2.0     04-30  Phos  4.6     04-30                  ABG:     VBG:       Care Discussed with Consultants/Other Providers:    RADIOLOGY & ADDITIONAL TESTS:  (Imaging Personally Reviewed)

## 2018-04-30 NOTE — PROGRESS NOTE ADULT - PROBLEM SELECTOR PLAN 3
- Supportive care and tx as above with acyclovir for HSV-2  - Tylenol PRN - Supportive care and tx as above with acyclovir for HSV-2  - will d/c morphine today  - Tylenol PRN

## 2018-04-30 NOTE — PROGRESS NOTE ADULT - SUBJECTIVE AND OBJECTIVE BOX
Infectious Diseases Follow up note   [id number]    F/u: Herpes meningitis    Interval History:   feeling much better. back pain continues to improve. took morphine once during night.   aunt at bedside       Review of systems:  General: no fever or chills.  HEENT: no sore throat.   Respiratory: no shortness of breath. no cough.  Cardiovascular: no chest pain. no palpitations.   Gastrointestinal :no nausea or vomiting. no abdominal pain. no diarrhea.   Genitourinary: no burning urine.   Musculoskeletal: no joint pain.  Lymphatic system: no swollen lymph nodes.   Skin: no rash.   Neurological: no headache.     Allergies  No Known Allergies      ANTIMICROBIALS:    acyclovir IVPB 800 every 8 hours    acetaminophen   Tablet 650 milliGRAM(s) Oral every 6 hours PRN  acetaminophen   Tablet. 650 milliGRAM(s) Oral every 6 hours PRN  acyclovir IVPB 800 milliGRAM(s) IV Intermittent every 8 hours  cyclobenzaprine 5 milliGRAM(s) Oral three times a day PRN  docusate sodium 100 milliGRAM(s) Oral two times a day  lidocaine   Patch 1 Patch Transdermal daily  melatonin 3 milliGRAM(s) Oral at bedtime  metoclopramide Injectable 10 milliGRAM(s) IV Push every 8 hours PRN  morphine  - Injectable 2 milliGRAM(s) IV Push every 4 hours PRN  senna 2 Tablet(s) Oral at bedtime  sodium chloride 0.9%. 1000 milliLiter(s) IV Continuous <Continuous>    Vital Signs Last 24 Hrs  T(F): 98.3 (04-30-18 @ 05:41), Max: 98.3 (04-29-18 @ 21:33)  HR: 56 (04-30-18 @ 05:41)  BP: 100/61 (04-30-18 @ 05:41)  RR: 18 (04-30-18 @ 05:41)  SpO2: 99% (04-30-18 @ 05:41) (98% - 100%)    PHYSICAL EXAM:  General:  non-toxic  HEAD/EYES:  PERRL  white sclera  ENT:  normal  supple  Cardiovascular:   no murmur   Respiratory:   clear to ausculation bilaterally  GI:   soft, non-tender, normal bowel sounds  :   no CVA tenderness   Musculoskeletal:  no point tenderness over spine  Neurologic:   non-focal exam   Skin:   no rash  Lymph:  no lymphadenopathy  Psychiatric:   appropriate affect, alert & oriented  Lines:  no phlebitis                                                14.1   6.78  )-----------( 203      ( 30 Apr 2018 05:40 )             41.6 04-30    140  |  103  |  11  ----------------------------<  93  4.0   |  22  |  1.08  Ca    8.6      30 Apr 2018 05:40Phos  4.6     04-30Mg     2.0     04-30               WBC Count: 7.79 (04-29 @ 06:04)  WBC Count: 11.81 (04-28 @ 06:03)  WBC Count: 10.03 (04-27 @ 08:32)                          14.1   6.78  )-----------( 203      ( 30 Apr 2018 05:40 )             41.6 04-30    140  |  103  |  11  ----------------------------<  93  4.0   |  22  |  1.08  Ca    8.6      30 Apr 2018 05:40Phos  4.6     04-30Mg     2.0     04-30        Microbiology:   Culture - CSF with Gram Stain . (04.27.18 @ 19:32)    Gram Stain Spinal Fluid:   WBC^White Blood Cells  QNTY CELLS IN GRAM STAIN: RARE (1+)  NOS^No Organisms Seen    Culture - CSF:   NO GROWTH - PRELIMINARY RESULTS  NO ORGANISMS ISOLATED AT 24 HOURS  NO ORGANISMS ISOLATED AT 48 HRS.    Specimen Source: CEREBRAL SPINAL FLUID        Culture - Blood (collected 27 Apr 2018 18:57)  Source: BLOOD VENOUS  Preliminary Report (28 Apr 2018 19:10):    NO ORGANISMS ISOLATED    NO ORGANISMS ISOLATED AT 24 HOURS    Culture - Blood (collected 27 Apr 2018 17:23)  Source: BLOOD PERIPHERAL  Preliminary Report (28 Apr 2018 17:23):    NO ORGANISMS ISOLATED    NO ORGANISMS ISOLATED AT 24 HOURS    Culture - Urine (collected 27 Apr 2018 11:37)  Source: URINE MIDSTREAM  Final Report (28 Apr 2018 08:27):    NO GROWTH AT 24 HOURS    HIV-1/2 Antigen/Antibody Screen by [facility] (04.28.18 @ 06:03)    HIV-1/2 Combo Result: Nonreactive The HIV Ag/Ab Combo test performed screens for HIV-1 p24  antigen, antibodies to HIV-1 (group M and group O), and  antibodies to HIV-2. All specimens repeatedly reactive  will reflex to an HIV 1/2 antibody confirmation and  differentiation test. This assay detects p24 antigen which  may be present prior to the development of HIV antibodies,  therefore a reactive result with a negative HIV 1/2 AB  Confirmation should be followed up with HIV-1 RNA, HIV-2  RNA and repeat testing in 4-8 weeks. A nonreactive result  does not preclude previous exposure to or infection with  HIV-1 or HIV-2. [facility] prohibits disclosure of this  result to any unauthorized party.        RADIOLOGY:    none new

## 2018-04-30 NOTE — PROGRESS NOTE ADULT - PROBLEM SELECTOR PLAN 1
- patient presented with headaches and photophobia with LP revealing aseptic meningitis, with elevated protein, elevated lymphocytes and PCR positive for HSV 2  - ID recs appreciated: c/w acyclovir 10mg/kg q 8hrs for 10-14 days, c/w  cc/hr and monitor for kidney dysfunction  - f/u with ID regarding duration of inpatient antibiotics and possible PICC placement  - Pain control with morphine and tylenol  - Reglan for nausea  - blood cultures negative and CSF gram stain/ culture neg/ NGTD - patient presented with headaches and photophobia with LP revealing aseptic meningitis, with elevated protein, elevated lymphocytes and PCR positive for HSV 2  - ID recs appreciated: c/w acyclovir 10mg/kg q 8hrs for 10-14 days, c/w  cc/hr and monitor for kidney dysfunction  - per discussion with ID, continue IV abx for today, and patient can be d/c francisco on PO valtrex  - Pain control with morphine and tylenol  - Reglan for nausea  - blood cultures negative and CSF gram stain/ culture neg/ NGTD

## 2018-04-30 NOTE — PROGRESS NOTE ADULT - ASSESSMENT
HSV 2 aseptic meningitis improving.  Back pain improving.    Would continue Acyclovir IV  Monitor Creat daily  d/c morphine  if continues to improve off morphine would transition acyclovir IV to valtrex 1 gm po TID through 5/12 HSV 2 aseptic meningitis improving.  Back pain improving.    Would continue Acyclovir IV  Monitor Creat daily  d/c morphine  if continues to improve off morphine would transition acyclovir IV to valtrex 1 gm po TID through 5/9

## 2018-04-30 NOTE — DISCHARGE NOTE ADULT - HOSPITAL COURSE
28yM with pmhx of wrestlers herpes? p/w b/l frontal pulsating HA since 4/26. Patient states that the HA woke him from sleep on Thursday morning at 3AM.  Associated with  nausea, fevers to 100.5 at home, lower back pain, photophobia, phonophobia. He assumed it was a migraine and took advil with no relief of his sx's.  Patient denies any rash, sick contacts, trauma, CP, SOB, chest palpitations, abdominal pain, dysuria, hematuria, joint pain, diarrhea, vomiting. Denies any prior hx of HA's or migraines. 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 States he hasn't had an outbreak in over a year. Denies any genital lesions, mouth lesions.  VS in the ED:  Febrile to 102.6, HR: , BP: 126//80, RR: 18 100% on RA. In the ED he received valium 2mg , ketorolac 45mg, solumedrol 40, reglan 10mg x 2, morphine 8mg, zofran, 3L NS bolus. Ceftriaxone 2g and Acyclovir 750mg    Patient underwent an LP and was found to have lymphocyte predominance, elevated protein and PCR positive for HSV2. Patient was treated with IV acyclovir along with fluids to decrease the risk of developing crystal nephropathy. His headache pain was controlled with Tylenol and morphine initially, and his headaches subsided over two days. Infectious disease was consulted on duration and dosing of antiviral, and patient was continued on IV acyclovir, with plan to transition to PO valtrex through 5/9.     Patient is HD stable and has marked improvement in his photo/phonophobia and headaches. He will follow with his PCP within 1 week of d/c and infectious disease within 2 weeks of discharge. 28yM with pmhx of wrestlers herpes? p/w b/l frontal pulsating HA since 4/26. Patient states that the HA woke him from sleep on Thursday morning at 3AM.  Associated with  nausea, fevers to 100.5 at home, lower back pain, photophobia, phonophobia. He assumed it was a migraine and took advil with no relief of his sx's.  Patient denies any rash, sick contacts, trauma, CP, SOB, chest palpitations, abdominal pain, dysuria, hematuria, joint pain, diarrhea, vomiting. Denies any prior hx of HA's or migraines. 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 States he hasn't had an outbreak in over a year. Denies any genital lesions, mouth lesions.  VS in the ED:  Febrile to 102.6, HR: , BP: 126//80, RR: 18 100% on RA. In the ED he received valium 2mg , ketorolac 45mg, solumedrol 40, reglan 10mg x 2, morphine 8mg, zofran, 3L NS bolus. Ceftriaxone 2g and Acyclovir 750mg  Patient underwent an LP and was found to have lymphocyte predominance, elevated protein and PCR positive for HSV2. Patient was treated with IV acyclovir along with fluids to decrease the risk of developing crystalline nephropathy. His headache pain was controlled with Tylenol and morphine initially, and his headaches subsided over two days. Infectious disease was consulted on duration and dosing of antiviral, and patient was continued on IV acyclovir, with plan to transition to PO valtrex through 5/9.     Patient is HD stable and has marked improvement in his photo/phonophobia and headaches. He will follow with his PCP within 1 week of d/c and infectious disease within 2 weeks of discharge.

## 2018-04-30 NOTE — PROGRESS NOTE ADULT - PROBLEM SELECTOR PLAN 4
DVT ppx: IMPROVE VTE Score: 0, no chemical ppx indicated, encourage ambulation  Diet- regular DVT ppx: IMPROVE VTE Score: 0, no chemical ppx indicated, encourage ambulation  Diet- regular  Dispo: likely tomorrow

## 2018-04-30 NOTE — DISCHARGE NOTE ADULT - ADDITIONAL INSTRUCTIONS
1. Please follow up with your primary care doctor within 1 week of discharge.  2. Please follow up with Dr. Ayers (infectious disease doctor) within 2 weeks of discharge    If you have worsening headaches, fevers, blurry vision, confusion or seizures, please return to the emergency department immediately. 1. Please follow up with your primary care doctor within 1 week of discharge.  2. Please follow up with Dr. Ayers (infectious disease doctor) within 2 weeks of discharge    [phone]  If you have worsening headaches, fevers, blurry vision, confusion or seizures, please return to the emergency department immediately.

## 2018-05-01 VITALS
OXYGEN SATURATION: 98 % | SYSTOLIC BLOOD PRESSURE: 110 MMHG | RESPIRATION RATE: 14 BRPM | DIASTOLIC BLOOD PRESSURE: 73 MMHG | HEART RATE: 50 BPM | TEMPERATURE: 98 F

## 2018-05-01 LAB
BUN SERPL-MCNC: 10 MG/DL — SIGNIFICANT CHANGE UP (ref 7–23)
CALCIUM SERPL-MCNC: 8.7 MG/DL — SIGNIFICANT CHANGE UP (ref 8.4–10.5)
CHLORIDE SERPL-SCNC: 104 MMOL/L — SIGNIFICANT CHANGE UP (ref 98–107)
CO2 SERPL-SCNC: 24 MMOL/L — SIGNIFICANT CHANGE UP (ref 22–31)
CREAT SERPL-MCNC: 1.03 MG/DL — SIGNIFICANT CHANGE UP (ref 0.5–1.3)
GLUCOSE SERPL-MCNC: 88 MG/DL — SIGNIFICANT CHANGE UP (ref 70–99)
MAGNESIUM SERPL-MCNC: 2 MG/DL — SIGNIFICANT CHANGE UP (ref 1.6–2.6)
PHOSPHATE SERPL-MCNC: 4.2 MG/DL — SIGNIFICANT CHANGE UP (ref 2.5–4.5)
POTASSIUM SERPL-MCNC: 4 MMOL/L — SIGNIFICANT CHANGE UP (ref 3.5–5.3)
POTASSIUM SERPL-SCNC: 4 MMOL/L — SIGNIFICANT CHANGE UP (ref 3.5–5.3)
SODIUM SERPL-SCNC: 141 MMOL/L — SIGNIFICANT CHANGE UP (ref 135–145)

## 2018-05-01 PROCEDURE — 99239 HOSP IP/OBS DSCHRG MGMT >30: CPT

## 2018-05-01 RX ORDER — VALACYCLOVIR 500 MG/1
1 TABLET, FILM COATED ORAL
Qty: 27 | Refills: 0
Start: 2018-05-01 | End: 2018-05-09

## 2018-05-01 RX ADMIN — Medication 650 MILLIGRAM(S): at 12:04

## 2018-05-01 RX ADMIN — Medication 100 MILLIGRAM(S): at 06:34

## 2018-05-01 RX ADMIN — Medication 266 MILLIGRAM(S): at 12:25

## 2018-05-01 RX ADMIN — Medication 266 MILLIGRAM(S): at 06:34

## 2018-05-01 NOTE — PROGRESS NOTE ADULT - SUBJECTIVE AND OBJECTIVE BOX
Samira Lyman PGY 1  Pager: [phone]/ [phone]    Patient is a 28y old  Male who presents with a chief complaint of Meninigitis (30 Apr 2018 15:10)      SUBJECTIVE / OVERNIGHT EVENTS:  Patient seen and examined at bedside.    Other Review of Systems Negative.    MEDICATIONS  (STANDING):  acyclovir IVPB 800 milliGRAM(s) IV Intermittent every 8 hours  docusate sodium 100 milliGRAM(s) Oral two times a day  lidocaine   Patch 1 Patch Transdermal daily  melatonin 3 milliGRAM(s) Oral at bedtime  senna 2 Tablet(s) Oral at bedtime  sodium chloride 0.9%. 1000 milliLiter(s) (150 mL/Hr) IV Continuous <Continuous>    MEDICATIONS  (PRN):  acetaminophen   Tablet 650 milliGRAM(s) Oral every 6 hours PRN For Temp greater than 38 C (100.4 F)  acetaminophen   Tablet. 650 milliGRAM(s) Oral every 6 hours PRN mild to moderate pain  cyclobenzaprine 5 milliGRAM(s) Oral three times a day PRN Muscle Spasm  metoclopramide Injectable 10 milliGRAM(s) IV Push every 8 hours PRN Nausea/vomiting      OBJECTIVE:    Vital Signs Last 24 Hrs  T(C): 36.7 (01 May 2018 06:33), Max: 37 (30 Apr 2018 21:53)  T(F): 98 (01 May 2018 06:33), Max: 98.6 (30 Apr 2018 21:53)  HR: 50 (01 May 2018 06:33) (50 - 102)  BP: 110/73 (01 May 2018 06:33) (110/73 - 123/80)  BP(mean): --  RR: 14 (01 May 2018 06:33) (14 - 18)  SpO2: 98% (01 May 2018 06:33) (98% - 100%)    CAPILLARY BLOOD GLUCOSE        I&O's Summary    30 Apr 2018 07:01  -  01 May 2018 07:00  --------------------------------------------------------  IN: 1750 mL / OUT: 0 mL / NET: 1750 mL        PHYSICAL EXAM:  GENERAL: NAD, well-developed  HEAD:  Atraumatic, Normocephalic  EYES: EOMI, PERRLA, conjunctiva and sclera clear  NECK: Supple, No JVD  CHEST/LUNG: Clear to auscultation bilaterally; No wheeze  HEART: Regular rate and rhythm; No murmurs, rubs, or gallops  ABDOMEN: Soft, Nontender, Nondistended; Bowel sounds present  EXTREMITIES:  2+ Peripheral Pulses, No clubbing, cyanosis, or edema  PSYCH: AAOx3  NEUROLOGY: non-focal  SKIN: No rashes or lesions    LABS:                        14.1   6.78  )-----------( 203      ( 30 Apr 2018 05:40 )             41.6     Auto Eosinophil # 0.16  / Auto Eosinophil % 2.4   / Auto Neutrophil # 3.43  / Auto Neutrophil % 50.6  / BANDS % x        05-01    141  |  104  |  10  ----------------------------<  88  4.0   |  24  |  1.03  04-30    140  |  103  |  11  ----------------------------<  93  4.0   |  22  |  1.08    Ca    8.7      01 May 2018 05:56  Mg     2.0     05-01  Phos  4.2     05-01 Samira Lyman PGY 1  Pager: [phone]/ [phone]    Patient is a 28y old  Male who presents with a chief complaint of Meninigitis (30 Apr 2018 15:10)      SUBJECTIVE / OVERNIGHT EVENTS:  Patient seen and examined at bedside. Patient reports his headaches have almost completely resolved and did not require any pain medications overnight. Photo and phonophobia are resolved and he denies any dizziness or lightheadedness. Patient with good PO intake, and observed to be walking around the unit without difficulty.     Other Review of Systems Negative.    MEDICATIONS  (STANDING):  acyclovir IVPB 800 milliGRAM(s) IV Intermittent every 8 hours  docusate sodium 100 milliGRAM(s) Oral two times a day  lidocaine   Patch 1 Patch Transdermal daily  melatonin 3 milliGRAM(s) Oral at bedtime  senna 2 Tablet(s) Oral at bedtime  sodium chloride 0.9%. 1000 milliLiter(s) (150 mL/Hr) IV Continuous <Continuous>    MEDICATIONS  (PRN):  acetaminophen   Tablet 650 milliGRAM(s) Oral every 6 hours PRN For Temp greater than 38 C (100.4 F)  acetaminophen   Tablet. 650 milliGRAM(s) Oral every 6 hours PRN mild to moderate pain  cyclobenzaprine 5 milliGRAM(s) Oral three times a day PRN Muscle Spasm  metoclopramide Injectable 10 milliGRAM(s) IV Push every 8 hours PRN Nausea/vomiting      OBJECTIVE:    Vital Signs Last 24 Hrs  T(C): 36.7 (01 May 2018 06:33), Max: 37 (30 Apr 2018 21:53)  T(F): 98 (01 May 2018 06:33), Max: 98.6 (30 Apr 2018 21:53)  HR: 50 (01 May 2018 06:33) (50 - 102)  BP: 110/73 (01 May 2018 06:33) (110/73 - 123/80)  BP(mean): --  RR: 14 (01 May 2018 06:33) (14 - 18)  SpO2: 98% (01 May 2018 06:33) (98% - 100%)    CAPILLARY BLOOD GLUCOSE        I&O's Summary    30 Apr 2018 07:01  -  01 May 2018 07:00  --------------------------------------------------------  IN: 1750 mL / OUT: 0 mL / NET: 1750 mL        PHYSICAL EXAM:  GENERAL: NAD, well-developed  HEAD:  Atraumatic, Normocephalic  EYES: EOMI, PERRLA, conjunctiva and sclera clear  NECK: Supple, No JVD  CHEST/LUNG: Clear to auscultation bilaterally; No wheeze  HEART: Regular rate and rhythm; No murmurs, rubs, or gallops  ABDOMEN: Soft, Nontender, Nondistended; Bowel sounds present  EXTREMITIES:  2+ Peripheral Pulses, No clubbing, cyanosis, or edema  PSYCH: AAOx3  NEUROLOGY: non-focal  SKIN: No rashes or lesions    LABS:                        14.1   6.78  )-----------( 203      ( 30 Apr 2018 05:40 )             41.6     Auto Eosinophil # 0.16  / Auto Eosinophil % 2.4   / Auto Neutrophil # 3.43  / Auto Neutrophil % 50.6  / BANDS % x        05-01    141  |  104  |  10  ----------------------------<  88  4.0   |  24  |  1.03  04-30    140  |  103  |  11  ----------------------------<  93  4.0   |  22  |  1.08    Ca    8.7      01 May 2018 05:56  Mg     2.0     05-01  Phos  4.2     05-01

## 2018-05-01 NOTE — PROGRESS NOTE ADULT - PROBLEM SELECTOR PLAN 4
DVT ppx: IMPROVE VTE Score: 0, no chemical ppx indicated, encourage ambulation  Diet- regular  Dispo: today

## 2018-05-01 NOTE — PROGRESS NOTE ADULT - PROBLEM SELECTOR PLAN 1
- patient presented with headaches, phonophobia and  photophobia with LP revealing aseptic meningitis, with elevated protein, elevated lymphocytes and PCR positive for HSV 2  - ID recs appreciated: inpatient, pt continued on acyclovir 10mg/kg q 8hrs, and will be transitioned to PO valtrex today until May 9th  - pain very well controlled on tylenol   - blood cultures negative and CSF gram stain/ culture neg/ NGTD

## 2018-05-01 NOTE — PROGRESS NOTE ADULT - ASSESSMENT
28M with PMHx of wrestlers herpes? 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

## 2018-05-01 NOTE — PROGRESS NOTE ADULT - NSHPATTENDINGPLANDISCUSS_GEN_ALL_CORE
patient and aunt
patient and mother
Dr. Lyman
Dr. Lyman
Patient, Dr. Warner
Patient, Family, Dr. MEGAN Lyman of [facility]

## 2018-05-01 NOTE — PROGRESS NOTE ADULT - PROBLEM SELECTOR PROBLEM 1
Meningitis due to herpes simplex virus

## 2018-05-01 NOTE — PROGRESS NOTE ADULT - ATTENDING COMMENTS
HSV 2 aseptic meningitis improving.  Still with back pain (though improving) which patient states preceded LP.     Would continue Acyclovir IV  Monitor Creat daily  If back pain persists image
Patient seen and examined. Currently, no complaints. Medically stable for discharge on PO Valtrex per ID recs. Follow up with ID on discharge.     Further details of plan per resident note above    DISCHARGE TIME- 31 MINUTES SPENT PREPARING DISCHARGE, INCLUDING PAPERWORK, MEDICATION RECONCILIATION, AND COUNSELLING OF PATIENT.
Patient seen and examined at bedside this AM. Mother presents. Felt light-headed earlier today but now feels ok. Still with back pain but it is improving with lidocaine patch. Will c/w IVF and IV acyclovir. Will f/u with ID re: long-term plan for antivirals.
Patient seen and examined. Reports headaches are much improved. Still with some back pain, mainly paraspinal than spinal. Will start cyclobenzapine 5mg po Qhrs prn for muscle spasm. Remaining care as above.
Patient seen and examined this morning. No reported complaints. Patient's symptoms significantly improved from admission. ID recs appreciated. Plan to observe on IV acyclovir through today and if clinically stable will discharge on PO acyclovir through 5/9/18.     Further details of plan per resident note above

## 2018-05-01 NOTE — PROGRESS NOTE ADULT - PROBLEM SELECTOR PLAN 2
- Patient meeting sepsis criteria with Fever, tachycardia.  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
- resolved  - on admission-patient meeting sepsis criteria with Fever, tachycardia  - sepsis 2/2 to herpes meningitis- treatment as per above  - HIV neg, UTox neg; f/u GC/Cl
- resolved- afebrile  - on admission-patient meeting sepsis criteria with Fever, tachycardia  - sepsis 2/2 to herpes meningitis- treatment as per above  - HIV neg, UTox + for opioids but patient already received morphine; f/u GC/Cl
- resolved- afebrile  - on admission-patient meeting sepsis criteria with Fever, tachycardia  - sepsis 2/2 to herpes meningitis- treatment as per above  - HIV neg, UTox + for opioids but patient already received morphine; f/u GC/Cl

## 2018-05-02 LAB
BACTERIA BLD CULT: SIGNIFICANT CHANGE UP
BACTERIA BLD CULT: SIGNIFICANT CHANGE UP

## 2018-05-03 LAB — BACTERIA CSF CULT: SIGNIFICANT CHANGE UP

## 2018-05-10 ENCOUNTER — APPOINTMENT (OUTPATIENT)
Dept: INFECTIOUS DISEASE | Facility: CLINIC | Age: 28
End: 2018-05-10
Payer: COMMERCIAL

## 2018-05-10 VITALS
OXYGEN SATURATION: 98 % | HEART RATE: 71 BPM | RESPIRATION RATE: 18 BRPM | HEIGHT: 69 IN | TEMPERATURE: 97.9 F | SYSTOLIC BLOOD PRESSURE: 133 MMHG | BODY MASS INDEX: 24.29 KG/M2 | WEIGHT: 164 LBS | DIASTOLIC BLOOD PRESSURE: 81 MMHG

## 2018-05-10 LAB
ALBUMIN SERPL ELPH-MCNC: 5 G/DL
ALP BLD-CCNC: 57 U/L
ALT SERPL-CCNC: 37 U/L
ANION GAP SERPL CALC-SCNC: 13 MMOL/L
AST SERPL-CCNC: 16 U/L
BASOPHILS # BLD AUTO: 0.03 K/UL
BASOPHILS NFR BLD AUTO: 0.5 %
BILIRUB SERPL-MCNC: 0.4 MG/DL
BUN SERPL-MCNC: 18 MG/DL
CALCIUM SERPL-MCNC: 10 MG/DL
CHLORIDE SERPL-SCNC: 99 MMOL/L
CO2 SERPL-SCNC: 26 MMOL/L
CREAT SERPL-MCNC: 0.98 MG/DL
EOSINOPHIL # BLD AUTO: 0.16 K/UL
EOSINOPHIL NFR BLD AUTO: 2.5 %
GLUCOSE SERPL-MCNC: 80 MG/DL
HCT VFR BLD CALC: 44.8 %
HGB BLD-MCNC: 15.5 G/DL
IMM GRANULOCYTES NFR BLD AUTO: 0.2 %
LYMPHOCYTES # BLD AUTO: 2.08 K/UL
LYMPHOCYTES NFR BLD AUTO: 32.1 %
MAN DIFF?: NORMAL
MCHC RBC-ENTMCNC: 30.4 PG
MCHC RBC-ENTMCNC: 34.6 GM/DL
MCV RBC AUTO: 87.8 FL
MONOCYTES # BLD AUTO: 0.55 K/UL
MONOCYTES NFR BLD AUTO: 8.5 %
NEUTROPHILS # BLD AUTO: 3.65 K/UL
NEUTROPHILS NFR BLD AUTO: 56.2 %
PLATELET # BLD AUTO: 270 K/UL
POTASSIUM SERPL-SCNC: 4.6 MMOL/L
PROT SERPL-MCNC: 8 G/DL
RBC # BLD: 5.1 M/UL
RBC # FLD: 13.4 %
SODIUM SERPL-SCNC: 138 MMOL/L
WBC # FLD AUTO: 6.48 K/UL

## 2018-05-10 PROCEDURE — 99213 OFFICE O/P EST LOW 20 MIN: CPT

## 2018-06-12 ENCOUNTER — APPOINTMENT (OUTPATIENT)
Dept: ORTHOPEDIC SURGERY | Facility: CLINIC | Age: 28
End: 2018-06-12
Payer: COMMERCIAL

## 2018-06-12 VITALS — BODY MASS INDEX: 24.29 KG/M2 | WEIGHT: 164 LBS | HEIGHT: 69 IN

## 2018-06-12 DIAGNOSIS — M25.461 EFFUSION, RIGHT KNEE: ICD-10-CM

## 2018-06-12 PROCEDURE — 99214 OFFICE O/P EST MOD 30 MIN: CPT

## 2018-06-27 ENCOUNTER — MEDICATION RENEWAL (OUTPATIENT)
Age: 28
End: 2018-06-27

## 2018-07-12 ENCOUNTER — APPOINTMENT (OUTPATIENT)
Dept: ORTHOPEDIC SURGERY | Facility: CLINIC | Age: 28
End: 2018-07-12
Payer: COMMERCIAL

## 2018-07-12 VITALS — HEIGHT: 69 IN | BODY MASS INDEX: 24.44 KG/M2 | WEIGHT: 165 LBS

## 2018-07-12 PROCEDURE — 99214 OFFICE O/P EST MOD 30 MIN: CPT

## 2018-09-14 PROBLEM — B00.9 HERPESVIRAL INFECTION, UNSPECIFIED: Chronic | Status: ACTIVE | Noted: 2018-04-27

## 2018-09-25 RX ORDER — VALACYCLOVIR 500 MG/1
500 TABLET, FILM COATED ORAL
Qty: 90 | Refills: 0 | Status: ACTIVE | COMMUNITY
Start: 2018-05-10

## 2018-10-02 ENCOUNTER — APPOINTMENT (OUTPATIENT)
Dept: ORTHOPEDIC SURGERY | Facility: CLINIC | Age: 28
End: 2018-10-02
Payer: COMMERCIAL

## 2018-10-02 VITALS
BODY MASS INDEX: 24.44 KG/M2 | HEIGHT: 69 IN | WEIGHT: 165 LBS | SYSTOLIC BLOOD PRESSURE: 130 MMHG | OXYGEN SATURATION: 98 % | HEART RATE: 54 BPM | DIASTOLIC BLOOD PRESSURE: 80 MMHG

## 2018-10-02 DIAGNOSIS — T84.89XA OTHER SPECIFIED COMPLICATION OF INTERNAL ORTHOPEDIC PROSTHETIC DEVICES, IMPLANTS AND GRAFTS, INITIAL ENCOUNTER: ICD-10-CM

## 2018-10-02 PROCEDURE — 99214 OFFICE O/P EST MOD 30 MIN: CPT

## 2018-10-02 RX ORDER — VALACYCLOVIR 1 G/1
1 TABLET, FILM COATED ORAL
Qty: 27 | Refills: 0 | Status: DISCONTINUED | COMMUNITY
Start: 2018-05-01 | End: 2018-10-02

## 2019-03-23 ENCOUNTER — CHART COPY (OUTPATIENT)
Age: 29
End: 2019-03-23

## 2020-02-21 NOTE — ED ADULT TRIAGE NOTE - BP NONINVASIVE DIASTOLIC (MM HG)
Last ECG on file was in July 2019  Patient will need ECG for further refills patient also has a LINQ implant. 90 day courtesy has been sent.   72

## 2020-11-21 ENCOUNTER — EMERGENCY (EMERGENCY)
Facility: HOSPITAL | Age: 30
LOS: 1 days | Discharge: HOME | End: 2020-11-21
Attending: EMERGENCY MEDICINE | Admitting: EMERGENCY MEDICINE
Payer: COMMERCIAL

## 2020-11-21 VITALS
HEIGHT: 69 IN | DIASTOLIC BLOOD PRESSURE: 85 MMHG | RESPIRATION RATE: 16 BRPM | OXYGEN SATURATION: 99 % | TEMPERATURE: 98 F | HEART RATE: 77 BPM | WEIGHT: 169.98 LBS | SYSTOLIC BLOOD PRESSURE: 136 MMHG

## 2020-11-21 DIAGNOSIS — Z48.02 ENCOUNTER FOR REMOVAL OF SUTURES: ICD-10-CM
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
## 2020-11-21 DIAGNOSIS — S01.81XD LACERATION WITHOUT FOREIGN BODY OF OTHER PART OF HEAD, SUBSEQUENT ENCOUNTER: ICD-10-CM
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
## 2020-11-21 PROCEDURE — 99283 EMERGENCY DEPT VISIT LOW MDM: CPT

## 2020-11-21 RX ORDER — CEPHALEXIN 500 MG
1 CAPSULE ORAL
Qty: 15 | Refills: 0
Start: 2020-11-21 | End: 2020-11-25

## 2020-11-21 NOTE — ED PROVIDER NOTE - CLINICAL SUMMARY MEDICAL DECISION MAKING FREE TEXT BOX
31 y/o male here for x7 sutures no red no DC no other complaints no fever no dizzy no headache no chills no NVD no chest pain no SOB no shakes no aches no other  injury no other complaints. Edges are not approximated. Steri-strips placed to further reinforce. Discussed wound care instructions and will give empiric abx given open wound. Follow up with pmd prn. I have discussed the discharge plan with the patient. The patient agrees with the plan, as discussed.  The patient understands Emergency Department diagnosis is a preliminary diagnosis often based on limited information and that the patient must adhere to the follow-up plan as discussed.  The patient understands that if the symptoms worsen or if prescribed medications do not have the desired/planned effect that the patient may return to the Emergency Department at any time for further evaluation and treatment.

## 2020-11-21 NOTE — ED PROVIDER NOTE - NSFOLLOWUPINSTRUCTIONS_ED_ALL_ED_FT
Please take antibiotics as discussed and when steri-strips fall off, please apply bacitracin 2-3 times daily for x2 weeks. Return to the Emergency Department if you have any new or worsening symptoms, or if you have any concerns.          Stitches Removal    WHAT YOU NEED TO KNOW:    Stitches are usually removed within 14 days, depending on the location of the wound. Your healthcare provider will tell you when to return to have your stitches removed. Your provider will use sterile forceps or tweezers to  the knot of each stitch. He or she will cut the stitch with scissors and pull the stitch out. You may feel a slight tug as the stitch comes out.    DISCHARGE INSTRUCTIONS:    Return to the emergency department if:   •Your wound splits open or is starting to come apart.      •You suddenly cannot move your injured joint.      •You have sudden numbness around your wound.      •You see red streaks coming from your wound.      Contact your healthcare provider if:   •You have a fever and chills.      •Your wound is red, warm, swollen, or leaking pus.      •There is a bad smell coming from your wound.      •You have increased pain in the wound area.      •You have questions or concerns about your condition or care.      Care for the area after the stitches are removed:   •Do not pull medical tape off. Your provider may place small strips of medical tape across your wound after the stitches have been removed. These strips will peel and fall of on their own. Do not pull them off.      •Clean the area as directed. Carefully wash the area with soap and water. Pat the area dry with a clean towel. Check the area for signs of infection, such as redness, swelling, or pus. Also check that the wound is not coming apart.      •Protect your wound. Your wound can swell, bleed, or split open if it is stretched or bumped. You may need to wear a bandage that supports your wound until it is completely healed.      •Care for a scar. You may have a scar after the stitches are removed. Use sunblock if the area is exposed to the sun. Apply it every day after the stitches are removed. This will help prevent skin discoloration. Talk to your healthcare provider about medicines you can use to make the scar less visible. Some medicines are available without a prescription.      Follow up with your healthcare provider as directed: You may need to return in 3 to 5 days if the stitches are on your face. Stitches on your scalp need to be removed after 7 to 14 days. Stitches over joints may remain in place up to 14 days. Write down your questions so you remember to ask them during your visits.        © Copyright [employer] 2020           back to top                          © Copyright [employer] 2020

## 2020-11-21 NOTE — ED ADULT NURSE NOTE - OBJECTIVE STATEMENT
29 y/o male came in to ED for suture removal to lower chin. There is no redness, or other signs of infection.

## 2020-11-21 NOTE — ED ADULT NURSE NOTE - FAMILY HISTORY
Grandparent  Still living? Unknown  Family history of diabetes mellitus, Age at diagnosis: Age Unknown

## 2020-11-21 NOTE — ED PROVIDER NOTE - SKIN LOCATION #1
edges of laceration not approximated/chin edges of laceration not approximated, no bleeding or purulent drainage noted./chin

## 2020-11-21 NOTE — ED PROVIDER NOTE - ATTENDING CONTRIBUTION TO CARE
31 y/o male no pmh here for suture removal chin after fall; sutures placed at [facility] 6 d ago. Denies pain, + wound dehisicence, no erythema, drainage.  Sutures removed, discussed signs/sx wound infection, likelihood of larger scar 2/2 dehiscence and possible early wound infection.
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
## 2020-12-05 ENCOUNTER — TRANSCRIPTION ENCOUNTER (OUTPATIENT)
Age: 30
End: 2020-12-05

## 2021-03-24 NOTE — DISCHARGE NOTE ADULT - PATIENT PORTAL LINK FT
Pt is a 51 yr old male with no PMH from home lives with wife who presented with shortness of breath. Pt states that we has been sick for a week with shortness of breath. He was diagnosed with covid 3 days ago and has been taking antibiotics and aspirin. Asthma You can access the [facility] Portal, offered by [facility], by registering with the following website: [email or web address]

## 2021-06-15 NOTE — PATIENT PROFILE ADULT. - PRO SERVICES AT [facility]
Spinal Block    Start time: 6/15/2021 9:33 AM  End time: 6/15/2021 9:45 AM  Performed by: Luz Carr MD  Authorized by: Luz Carr MD     Pre-procedure:   Indications: primary anesthetic  Preanesthetic Checklist: patient identified, risks and benefits discussed, anesthesia consent, patient being monitored and timeout performed    Timeout Time: 09:33 EDT          Spinal Block:   Patient Position:  Seated  Prep Region:  Lumbar  Prep: chlorhexidine and patient draped      Location:  L3-4  Technique:  Single shot    Local Dose (mL):  3    Needle:   Needle Type:  Pencan  Needle Gauge:  25 G  Attempts:  3      Events: CSF confirmed, no blood with aspiration and no paresthesia        Assessment:  Insertion:  Uncomplicated  Patient tolerance:  Patient tolerated the procedure well with no immediate complications  No success with long pencan for 1st and 2nd attempts- finally used 17g Touhy as introducer for success
outpatient services

## 2023-05-12 NOTE — PROGRESS NOTE ADULT - SUBJECTIVE AND OBJECTIVE BOX
pt back from IR. as per IR pt had R side PICC placed @35 cm right basilic Samira Lyman PGY 1  Pager: [phone]/ [phone]    Patient is a 28y old  Male who presents with a chief complaint of Meninigitis (2018 22:53)      SUBJECTIVE / OVERNIGHT EVENTS:  Patient seen and examined at bedside with no acute events overnight. Patient reports he is feeling well, but continues to have photo and phonophobia. He reports he was a little light headed this morning when getting up to go to the bathroom, but states this happens  very frequently when he is home.      MEDICATIONS  (STANDING):  acyclovir IVPB 800 milliGRAM(s) IV Intermittent every 8 hours  lidocaine   Patch 1 Patch Transdermal daily  melatonin 3 milliGRAM(s) Oral at bedtime  sodium chloride 0.9%. 1000 milliLiter(s) (150 mL/Hr) IV Continuous <Continuous>    MEDICATIONS  (PRN):  acetaminophen   Tablet 650 milliGRAM(s) Oral every 6 hours PRN For Temp greater than 38 C (100.4 F)  acetaminophen   Tablet. 650 milliGRAM(s) Oral every 6 hours PRN mild to moderate pain  cyclobenzaprine 5 milliGRAM(s) Oral three times a day PRN Muscle Spasm  metoclopramide Injectable 10 milliGRAM(s) IV Push every 8 hours PRN Nausea/vomiting  morphine  - Injectable 2 milliGRAM(s) IV Push every 4 hours PRN Severe Pain (7 - 10)      OBJECTIVE:    Vital Signs Last 24 Hrs  T(C): 36.8 (2018 06:26), Max: 37.6 (2018 04:38)  T(F): 98.2 (2018 06:26), Max: 99.6 (2018 04:38)  HR: 70 (2018 06:26) (61 - 72)  BP: 114/68 (2018 06:26) (114/68 - 142/78)  BP(mean): --  RR: 18 (2018 06:26) (18 - 19)  SpO2: 100% (2018 06:26) (97% - 100%)    CAPILLARY BLOOD GLUCOSE        I&O's Summary    2018 07:01  -  2018 07:00  --------------------------------------------------------  IN: 2200 mL / OUT: 0 mL / NET: 2200 mL        PHYSICAL EXAM:  GENERAL: NAD, well-developed  HEAD:  Atraumatic, Normocephalic  EYES: EOMI, PERRLA, conjunctiva and sclera clear  NECK: Supple, No JVD  CHEST/LUNG: Clear to auscultation bilaterally; No wheeze  HEART: Regular rate and rhythm; No murmurs, rubs, or gallops  ABDOMEN: Soft, Nontender, Nondistended; Bowel sounds present  EXTREMITIES:  2+ Peripheral Pulses, No clubbing, cyanosis, or edema  PSYCH: AAOx3  NEUROLOGY: non-focal  SKIN: No rashes or lesions    LABS:                        13.9   7.79  )-----------( 209      ( 2018 06:04 )             40.3     Auto Eosinophil # 0.10  / Auto Eosinophil % 1.3   / Auto Neutrophil # 4.77  / Auto Neutrophil % 61.2  / BANDS % x                            13.7   11.81 )-----------( 217      ( 2018 06:03 )             40.3     Auto Eosinophil # x     / Auto Eosinophil % x     / Auto Neutrophil # x     / Auto Neutrophil % x     / BANDS % x            139  |  101  |  8   ----------------------------<  91  3.9   |  24  |  1.07      139  |  103  |  6<L>  ----------------------------<  91  3.8   |  24  |  1.14    Ca    8.5      2018 06:04  Mg     1.8       Phos  4.3               Urinalysis Basic - ( 2018 09:49 )    Color: PLYEL / Appearance: CLEAR / S.016 / pH: 6.5  Gluc: NEGATIVE / Ketone: NEGATIVE  / Bili: NEGATIVE / Urobili: NORMAL mg/dL   Blood: NEGATIVE / Protein: 20 mg/dL / Nitrite: NEGATIVE   Leuk Esterase: NEGATIVE / RBC: 0-2 / WBC 0-2   Sq Epi: x / Non Sq Epi: x / Bacteria: x          RESPIRATORY  VENT:    ABG:     VBG:     RADIOLOGY & ADDITIONAL TESTS:  (Imaging Personally Reviewed)    Consultant(s) Notes Reviewed:      Care Discussed with Consultants/Other Providers: Samira Lyman PGY 1  Pager: [phone]/ [phone]    Patient is a 28y old  Male who presents with a chief complaint of Meninigitis (2018 22:53)      SUBJECTIVE / OVERNIGHT EVENTS:  Patient seen and examined at bedside with no acute events overnight. Patient reports he is feeling well, but continues to have photo and phonophobia. He reports he was a little light headed this morning when getting up to go to the bathroom, but states this happens  very frequently when he is home.      MEDICATIONS  (STANDING):  acyclovir IVPB 800 milliGRAM(s) IV Intermittent every 8 hours  lidocaine   Patch 1 Patch Transdermal daily  melatonin 3 milliGRAM(s) Oral at bedtime  sodium chloride 0.9%. 1000 milliLiter(s) (150 mL/Hr) IV Continuous <Continuous>    MEDICATIONS  (PRN):  acetaminophen   Tablet 650 milliGRAM(s) Oral every 6 hours PRN For Temp greater than 38 C (100.4 F)  acetaminophen   Tablet. 650 milliGRAM(s) Oral every 6 hours PRN mild to moderate pain  cyclobenzaprine 5 milliGRAM(s) Oral three times a day PRN Muscle Spasm  metoclopramide Injectable 10 milliGRAM(s) IV Push every 8 hours PRN Nausea/vomiting  morphine  - Injectable 2 milliGRAM(s) IV Push every 4 hours PRN Severe Pain (7 - 10)      OBJECTIVE:    Vital Signs Last 24 Hrs  T(C): 36.8 (2018 06:26), Max: 37.6 (2018 04:38)  T(F): 98.2 (2018 06:26), Max: 99.6 (2018 04:38)  HR: 70 (2018 06:26) (61 - 72)  BP: 114/68 (2018 06:26) (114/68 - 142/78)  BP(mean): --  RR: 18 (2018 06:26) (18 - 19)  SpO2: 100% (2018 06:26) (97% - 100%)    CAPILLARY BLOOD GLUCOSE        I&O's Summary    2018 07:01  -  2018 07:00  --------------------------------------------------------  IN: 2200 mL / OUT: 0 mL / NET: 2200 mL        PHYSICAL EXAM:  GENERAL: NAD, well-developed  HEAD:  Atraumatic, Normocephalic  EYES: EOMI, PERRLA, conjunctiva and sclera clear  NECK: Supple, No JVD  CHEST/LUNG: Clear to auscultation bilaterally; No wheeze  HEART: Regular rate and rhythm; No murmurs, rubs, or gallops  ABDOMEN: Soft, Nontender, Nondistended; Bowel sounds present  EXTREMITIES:  2+ Peripheral Pulses, No clubbing, cyanosis, or edema  PSYCH: AAOx3  NEUROLOGY: non-focal  SKIN: No rashes or lesions    LABS:                        13.9   7.79  )-----------( 209      ( 2018 06:04 )             40.3     Auto Eosinophil # 0.10  / Auto Eosinophil % 1.3   / Auto Neutrophil # 4.77  / Auto Neutrophil % 61.2  / BANDS % x                            13.7   11.81 )-----------( 217      ( 2018 06:03 )             40.3     Auto Eosinophil # x     / Auto Eosinophil % x     / Auto Neutrophil # x     / Auto Neutrophil % x     / BANDS % x            139  |  101  |  8   ----------------------------<  91  3.9   |  24  |  1.07      139  |  103  |  6<L>  ----------------------------<  91  3.8   |  24  |  1.14    Ca    8.5      2018 06:04  Mg     1.8       Phos  4.3               Urinalysis Basic - ( 2018 09:49 )    Color: PLYEL / Appearance: CLEAR / S.016 / pH: 6.5  Gluc: NEGATIVE / Ketone: NEGATIVE  / Bili: NEGATIVE / Urobili: NORMAL mg/dL   Blood: NEGATIVE / Protein: 20 mg/dL / Nitrite: NEGATIVE   Leuk Esterase: NEGATIVE / RBC: 0-2 / WBC 0-2   Sq Epi: x / Non Sq Epi: x / Bacteria: x          RESPIRATORY  VENT:    ABG:     VBG:     RADIOLOGY & ADDITIONAL TESTS:  (Imaging Personally Reviewed)    Consultant(s) Notes Reviewed:      Care Discussed with Consultants/Other Providers: ID (Dr. Ayers) re: plan of care